# Patient Record
Sex: FEMALE | Race: BLACK OR AFRICAN AMERICAN | Employment: OTHER | URBAN - METROPOLITAN AREA
[De-identification: names, ages, dates, MRNs, and addresses within clinical notes are randomized per-mention and may not be internally consistent; named-entity substitution may affect disease eponyms.]

---

## 2021-05-25 ENCOUNTER — APPOINTMENT (OUTPATIENT)
Dept: NON INVASIVE DIAGNOSTICS | Age: 86
DRG: 682 | End: 2021-05-25
Attending: INTERNAL MEDICINE
Payer: MEDICARE

## 2021-05-25 ENCOUNTER — HOSPITAL ENCOUNTER (INPATIENT)
Age: 86
LOS: 4 days | Discharge: NURSING, REHAB OR LONG-TERM CARE | DRG: 682 | End: 2021-05-29
Attending: EMERGENCY MEDICINE | Admitting: INTERNAL MEDICINE
Payer: MEDICARE

## 2021-05-25 ENCOUNTER — APPOINTMENT (OUTPATIENT)
Dept: ULTRASOUND IMAGING | Age: 86
DRG: 682 | End: 2021-05-25
Attending: INTERNAL MEDICINE
Payer: MEDICARE

## 2021-05-25 DIAGNOSIS — Z95.0 PRESENCE OF PERMANENT CARDIAC PACEMAKER: ICD-10-CM

## 2021-05-25 DIAGNOSIS — I10 ESSENTIAL HYPERTENSION: ICD-10-CM

## 2021-05-25 DIAGNOSIS — R41.0 CONFUSION: ICD-10-CM

## 2021-05-25 DIAGNOSIS — I48.0 PAROXYSMAL A-FIB (HCC): ICD-10-CM

## 2021-05-25 DIAGNOSIS — E87.5 ACUTE HYPERKALEMIA: ICD-10-CM

## 2021-05-25 DIAGNOSIS — Z71.89 GOALS OF CARE, COUNSELING/DISCUSSION: ICD-10-CM

## 2021-05-25 DIAGNOSIS — R53.81 DEBILITY: ICD-10-CM

## 2021-05-25 DIAGNOSIS — N17.9 AKI (ACUTE KIDNEY INJURY) (HCC): Primary | ICD-10-CM

## 2021-05-25 PROBLEM — N18.9 RENAL FAILURE (ARF), ACUTE ON CHRONIC (HCC): Status: ACTIVE | Noted: 2021-05-25

## 2021-05-25 PROBLEM — E86.0 DEHYDRATION: Status: ACTIVE | Noted: 2021-05-25

## 2021-05-25 LAB
ALBUMIN SERPL-MCNC: 2.9 G/DL (ref 3.5–5)
ALBUMIN/GLOB SERPL: 0.6 {RATIO} (ref 1.1–2.2)
ALP SERPL-CCNC: 67 U/L (ref 45–117)
ALT SERPL-CCNC: 50 U/L (ref 12–78)
ANION GAP SERPL CALC-SCNC: 2 MMOL/L (ref 5–15)
AST SERPL-CCNC: 33 U/L (ref 15–37)
ATRIAL RATE: 267 BPM
BASOPHILS # BLD: 0 K/UL (ref 0–0.1)
BASOPHILS NFR BLD: 0 % (ref 0–1)
BILIRUB SERPL-MCNC: 0.4 MG/DL (ref 0.2–1)
BUN SERPL-MCNC: 77 MG/DL (ref 6–20)
BUN/CREAT SERPL: 22 (ref 12–20)
CALCIUM SERPL-MCNC: 9.2 MG/DL (ref 8.5–10.1)
CALCULATED R AXIS, ECG10: 4 DEGREES
CALCULATED T AXIS, ECG11: 113 DEGREES
CHLORIDE SERPL-SCNC: 97 MMOL/L (ref 97–108)
CO2 SERPL-SCNC: 37 MMOL/L (ref 21–32)
COMMENT, HOLDF: NORMAL
CREAT SERPL-MCNC: 3.5 MG/DL (ref 0.55–1.02)
DIAGNOSIS, 93000: NORMAL
DIFFERENTIAL METHOD BLD: ABNORMAL
ECHO AO ROOT DIAM: 3.11 CM
ECHO AV AREA PEAK VELOCITY: 1.9 CM2
ECHO AV AREA/BSA PEAK VELOCITY: 0.9 CM2/M2
ECHO AV PEAK GRADIENT: 9.64 MMHG
ECHO AV PEAK VELOCITY: 155.27 CM/S
ECHO EST RA PRESSURE: 8 MMHG
ECHO LA AREA 4C: 36.03 CM2
ECHO LA MAJOR AXIS: 4.03 CM
ECHO LA MINOR AXIS: 2 CM
ECHO LA VOL 2C: 91.14 ML (ref 22–52)
ECHO LA VOL 4C: 127.26 ML (ref 22–52)
ECHO LA VOL BP: 122.06 ML (ref 22–52)
ECHO LA VOL/BSA BIPLANE: 60.73 ML/M2 (ref 16–28)
ECHO LA VOLUME INDEX A2C: 45.34 ML/M2 (ref 16–28)
ECHO LA VOLUME INDEX A4C: 63.31 ML/M2 (ref 16–28)
ECHO LV E' LATERAL VELOCITY: 17.53 CENTIMETER/SECOND
ECHO LV E' SEPTAL VELOCITY: 8.28 CENTIMETER/SECOND
ECHO LV INTERNAL DIMENSION DIASTOLIC: 5.04 CM (ref 3.9–5.3)
ECHO LV INTERNAL DIMENSION SYSTOLIC: 3.39 CM
ECHO LV IVSD: 1.25 CM (ref 0.6–0.9)
ECHO LV MASS 2D: 217.7 G (ref 67–162)
ECHO LV MASS INDEX 2D: 108.3 G/M2 (ref 43–95)
ECHO LV POSTERIOR WALL DIASTOLIC: 1.01 CM (ref 0.6–0.9)
ECHO LVOT DIAM: 1.87 CM
ECHO LVOT PEAK GRADIENT: 4.66 MMHG
ECHO LVOT PEAK VELOCITY: 107.97 CM/S
ECHO MV E DECELERATION TIME (DT): 167.83 MS
ECHO MV E VELOCITY: 133.56 CENTIMETER/SECOND
ECHO PV PEAK INSTANTANEOUS GRADIENT SYSTOLIC: 2.25 MMHG
ECHO PV REGURGITANT MAX VELOCITY: 74.85 CM/S
ECHO RIGHT VENTRICULAR SYSTOLIC PRESSURE (RVSP): 63.88 MMHG
ECHO RV INTERNAL DIMENSION: 4.19 CM
ECHO RV TAPSE: 1.32 CM (ref 1.5–2)
ECHO TV REGURGITANT MAX VELOCITY: 373.77 CM/S
ECHO TV REGURGITANT PEAK GRADIENT: 55.88 MMHG
EOSINOPHIL # BLD: 0.1 K/UL (ref 0–0.4)
EOSINOPHIL NFR BLD: 2 % (ref 0–7)
ERYTHROCYTE [DISTWIDTH] IN BLOOD BY AUTOMATED COUNT: 17.6 % (ref 11.5–14.5)
FERRITIN SERPL-MCNC: 160 NG/ML (ref 26–388)
FOLATE SERPL-MCNC: 18.8 NG/ML (ref 5–21)
GLOBULIN SER CALC-MCNC: 4.8 G/DL (ref 2–4)
GLUCOSE SERPL-MCNC: 122 MG/DL (ref 65–100)
HAPTOGLOB SERPL-MCNC: 137 MG/DL (ref 30–200)
HCT VFR BLD AUTO: 29.7 % (ref 35–47)
HEMOCCULT STL QL: NEGATIVE
HGB BLD-MCNC: 9.1 G/DL (ref 11.5–16)
IMM GRANULOCYTES # BLD AUTO: 0.1 K/UL (ref 0–0.04)
IMM GRANULOCYTES NFR BLD AUTO: 1 % (ref 0–0.5)
IRON SATN MFR SERPL: 19 % (ref 20–50)
IRON SERPL-MCNC: 50 UG/DL (ref 35–150)
LA VOL DISK BP: 113.73 ML (ref 22–52)
LDH SERPL L TO P-CCNC: 257 U/L (ref 81–246)
LYMPHOCYTES # BLD: 0.6 K/UL (ref 0.8–3.5)
LYMPHOCYTES NFR BLD: 10 % (ref 12–49)
MCH RBC QN AUTO: 30.7 PG (ref 26–34)
MCHC RBC AUTO-ENTMCNC: 30.6 G/DL (ref 30–36.5)
MCV RBC AUTO: 100.3 FL (ref 80–99)
MONOCYTES # BLD: 0.6 K/UL (ref 0–1)
MONOCYTES NFR BLD: 9 % (ref 5–13)
NEUTS SEG # BLD: 5 K/UL (ref 1.8–8)
NEUTS SEG NFR BLD: 78 % (ref 32–75)
NRBC # BLD: 0 K/UL (ref 0–0.01)
NRBC BLD-RTO: 0 PER 100 WBC
OSMOLALITY SERPL: 331 MOSM/KG H2O
PLATELET # BLD AUTO: 168 K/UL (ref 150–400)
PMV BLD AUTO: 9.5 FL (ref 8.9–12.9)
POTASSIUM SERPL-SCNC: 5.4 MMOL/L (ref 3.5–5.1)
PROCALCITONIN SERPL-MCNC: <0.05 NG/ML
PROT SERPL-MCNC: 7.7 G/DL (ref 6.4–8.2)
Q-T INTERVAL, ECG07: 366 MS
QRS DURATION, ECG06: 98 MS
QTC CALCULATION (BEZET), ECG08: 457 MS
RBC # BLD AUTO: 2.96 M/UL (ref 3.8–5.2)
RBC MORPH BLD: ABNORMAL
RETICS # AUTO: 0.1 M/UL (ref 0.02–0.08)
RETICS/RBC NFR AUTO: 3.4 % (ref 0.7–2.1)
SAMPLES BEING HELD,HOLD: NORMAL
SODIUM SERPL-SCNC: 136 MMOL/L (ref 136–145)
TIBC SERPL-MCNC: 266 UG/DL (ref 250–450)
TSH SERPL DL<=0.05 MIU/L-ACNC: 0.98 UIU/ML (ref 0.36–3.74)
VENTRICULAR RATE, ECG03: 94 BPM
VIT B12 SERPL-MCNC: 1534 PG/ML (ref 193–986)
WBC # BLD AUTO: 6.4 K/UL (ref 3.6–11)

## 2021-05-25 PROCEDURE — 83615 LACTATE (LD) (LDH) ENZYME: CPT

## 2021-05-25 PROCEDURE — 84145 PROCALCITONIN (PCT): CPT

## 2021-05-25 PROCEDURE — 74011250636 HC RX REV CODE- 250/636: Performed by: INTERNAL MEDICINE

## 2021-05-25 PROCEDURE — 84443 ASSAY THYROID STIM HORMONE: CPT

## 2021-05-25 PROCEDURE — 77030019905 HC CATH URETH INTMIT MDII -A

## 2021-05-25 PROCEDURE — 93306 TTE W/DOPPLER COMPLETE: CPT

## 2021-05-25 PROCEDURE — 85025 COMPLETE CBC W/AUTO DIFF WBC: CPT

## 2021-05-25 PROCEDURE — 80053 COMPREHEN METABOLIC PANEL: CPT

## 2021-05-25 PROCEDURE — 82272 OCCULT BLD FECES 1-3 TESTS: CPT

## 2021-05-25 PROCEDURE — 82607 VITAMIN B-12: CPT

## 2021-05-25 PROCEDURE — 94640 AIRWAY INHALATION TREATMENT: CPT

## 2021-05-25 PROCEDURE — 83930 ASSAY OF BLOOD OSMOLALITY: CPT

## 2021-05-25 PROCEDURE — 77030038269 HC DRN EXT URIN PURWCK BARD -A

## 2021-05-25 PROCEDURE — 99285 EMERGENCY DEPT VISIT HI MDM: CPT

## 2021-05-25 PROCEDURE — 36415 COLL VENOUS BLD VENIPUNCTURE: CPT

## 2021-05-25 PROCEDURE — 85045 AUTOMATED RETICULOCYTE COUNT: CPT

## 2021-05-25 PROCEDURE — 93306 TTE W/DOPPLER COMPLETE: CPT | Performed by: INTERNAL MEDICINE

## 2021-05-25 PROCEDURE — 65270000029 HC RM PRIVATE

## 2021-05-25 PROCEDURE — 83540 ASSAY OF IRON: CPT

## 2021-05-25 PROCEDURE — 82746 ASSAY OF FOLIC ACID SERUM: CPT

## 2021-05-25 PROCEDURE — 82728 ASSAY OF FERRITIN: CPT

## 2021-05-25 PROCEDURE — 77030029684 HC NEB SM VOL KT MONA -A

## 2021-05-25 PROCEDURE — 74011250637 HC RX REV CODE- 250/637: Performed by: INTERNAL MEDICINE

## 2021-05-25 PROCEDURE — 93005 ELECTROCARDIOGRAM TRACING: CPT

## 2021-05-25 PROCEDURE — 74011000250 HC RX REV CODE- 250: Performed by: INTERNAL MEDICINE

## 2021-05-25 PROCEDURE — 83010 ASSAY OF HAPTOGLOBIN QUANT: CPT

## 2021-05-25 PROCEDURE — 76770 US EXAM ABDO BACK WALL COMP: CPT

## 2021-05-25 PROCEDURE — 99223 1ST HOSP IP/OBS HIGH 75: CPT | Performed by: INTERNAL MEDICINE

## 2021-05-25 RX ORDER — ATENOLOL 50 MG/1
50 TABLET ORAL DAILY
Status: DISCONTINUED | OUTPATIENT
Start: 2021-05-26 | End: 2021-05-25

## 2021-05-25 RX ORDER — ATORVASTATIN CALCIUM 20 MG/1
40 TABLET, FILM COATED ORAL
Status: DISCONTINUED | OUTPATIENT
Start: 2021-05-25 | End: 2021-05-29 | Stop reason: HOSPADM

## 2021-05-25 RX ORDER — BUDESONIDE AND FORMOTEROL FUMARATE DIHYDRATE 80; 4.5 UG/1; UG/1
2 AEROSOL RESPIRATORY (INHALATION) 2 TIMES DAILY
COMMUNITY

## 2021-05-25 RX ORDER — POLYETHYLENE GLYCOL 3350 17 G/17G
17 POWDER, FOR SOLUTION ORAL DAILY PRN
Status: DISCONTINUED | OUTPATIENT
Start: 2021-05-25 | End: 2021-05-29 | Stop reason: HOSPADM

## 2021-05-25 RX ORDER — BISMUTH SUBSALICYLATE 262 MG
1 TABLET,CHEWABLE ORAL DAILY
COMMUNITY

## 2021-05-25 RX ORDER — ATORVASTATIN CALCIUM 20 MG/1
20 TABLET, FILM COATED ORAL DAILY
Status: DISCONTINUED | OUTPATIENT
Start: 2021-05-26 | End: 2021-05-25

## 2021-05-25 RX ORDER — ARFORMOTEROL TARTRATE 15 UG/2ML
15 SOLUTION RESPIRATORY (INHALATION)
Status: DISCONTINUED | OUTPATIENT
Start: 2021-05-25 | End: 2021-05-29 | Stop reason: HOSPADM

## 2021-05-25 RX ORDER — ACETAMINOPHEN 500 MG
2000 TABLET ORAL DAILY
COMMUNITY

## 2021-05-25 RX ORDER — TORSEMIDE 20 MG/1
20 TABLET ORAL 2 TIMES DAILY
COMMUNITY

## 2021-05-25 RX ORDER — ACETAMINOPHEN 650 MG/1
650 SUPPOSITORY RECTAL
Status: DISCONTINUED | OUTPATIENT
Start: 2021-05-25 | End: 2021-05-29 | Stop reason: HOSPADM

## 2021-05-25 RX ORDER — DIPHENOXYLATE HYDROCHLORIDE AND ATROPINE SULFATE 2.5; .025 MG/1; MG/1
1 TABLET ORAL
COMMUNITY

## 2021-05-25 RX ORDER — LISINOPRIL 20 MG/1
20 TABLET ORAL DAILY
COMMUNITY
End: 2021-05-29

## 2021-05-25 RX ORDER — ONDANSETRON 2 MG/ML
4 INJECTION INTRAMUSCULAR; INTRAVENOUS
Status: DISCONTINUED | OUTPATIENT
Start: 2021-05-25 | End: 2021-05-29 | Stop reason: HOSPADM

## 2021-05-25 RX ORDER — LANOLIN ALCOHOL/MO/W.PET/CERES
325 CREAM (GRAM) TOPICAL
COMMUNITY

## 2021-05-25 RX ORDER — SERTRALINE HYDROCHLORIDE 25 MG/1
25 TABLET, FILM COATED ORAL DAILY
Status: DISCONTINUED | OUTPATIENT
Start: 2021-05-26 | End: 2021-05-29 | Stop reason: HOSPADM

## 2021-05-25 RX ORDER — SODIUM CHLORIDE, SODIUM LACTATE, POTASSIUM CHLORIDE, CALCIUM CHLORIDE 600; 310; 30; 20 MG/100ML; MG/100ML; MG/100ML; MG/100ML
50 INJECTION, SOLUTION INTRAVENOUS CONTINUOUS
Status: DISCONTINUED | OUTPATIENT
Start: 2021-05-25 | End: 2021-05-26

## 2021-05-25 RX ORDER — ONDANSETRON 4 MG/1
4 TABLET, ORALLY DISINTEGRATING ORAL
Status: DISCONTINUED | OUTPATIENT
Start: 2021-05-25 | End: 2021-05-29 | Stop reason: HOSPADM

## 2021-05-25 RX ORDER — POLYETHYLENE GLYCOL 3350 17 G/17G
17 POWDER, FOR SOLUTION ORAL
COMMUNITY

## 2021-05-25 RX ORDER — TRAZODONE HYDROCHLORIDE 50 MG/1
75 TABLET ORAL
COMMUNITY
End: 2021-05-29

## 2021-05-25 RX ORDER — SENNOSIDES 8.6 MG/1
1 TABLET ORAL DAILY
COMMUNITY

## 2021-05-25 RX ORDER — AMLODIPINE BESYLATE 5 MG/1
10 TABLET ORAL DAILY
Status: DISCONTINUED | OUTPATIENT
Start: 2021-05-26 | End: 2021-05-25

## 2021-05-25 RX ORDER — SODIUM CHLORIDE 0.9 % (FLUSH) 0.9 %
5-40 SYRINGE (ML) INJECTION EVERY 8 HOURS
Status: DISCONTINUED | OUTPATIENT
Start: 2021-05-25 | End: 2021-05-29 | Stop reason: HOSPADM

## 2021-05-25 RX ORDER — FAMOTIDINE 20 MG/1
20 TABLET, FILM COATED ORAL DAILY
Status: DISCONTINUED | OUTPATIENT
Start: 2021-05-26 | End: 2021-05-25

## 2021-05-25 RX ORDER — AMLODIPINE BESYLATE 5 MG/1
5 TABLET ORAL DAILY
Status: DISCONTINUED | OUTPATIENT
Start: 2021-05-26 | End: 2021-05-29 | Stop reason: HOSPADM

## 2021-05-25 RX ORDER — BUDESONIDE 0.5 MG/2ML
500 INHALANT ORAL
Status: DISCONTINUED | OUTPATIENT
Start: 2021-05-25 | End: 2021-05-29 | Stop reason: HOSPADM

## 2021-05-25 RX ORDER — IPRATROPIUM BROMIDE AND ALBUTEROL SULFATE 2.5; .5 MG/3ML; MG/3ML
3 SOLUTION RESPIRATORY (INHALATION)
Status: DISCONTINUED | OUTPATIENT
Start: 2021-05-25 | End: 2021-05-29 | Stop reason: HOSPADM

## 2021-05-25 RX ORDER — IPRATROPIUM BROMIDE AND ALBUTEROL SULFATE 2.5; .5 MG/3ML; MG/3ML
3 SOLUTION RESPIRATORY (INHALATION)
COMMUNITY

## 2021-05-25 RX ORDER — CLOPIDOGREL BISULFATE 75 MG/1
75 TABLET ORAL DAILY
Status: DISCONTINUED | OUTPATIENT
Start: 2021-05-26 | End: 2021-05-25

## 2021-05-25 RX ORDER — ACETAMINOPHEN, DIPHENHYDRAMINE HCL, PHENYLEPHRINE HCL 325; 25; 5 MG/1; MG/1; MG/1
10 TABLET ORAL
COMMUNITY

## 2021-05-25 RX ORDER — PANTOPRAZOLE SODIUM 40 MG/1
40 TABLET, DELAYED RELEASE ORAL
Status: DISCONTINUED | OUTPATIENT
Start: 2021-05-25 | End: 2021-05-29 | Stop reason: HOSPADM

## 2021-05-25 RX ORDER — ACETAMINOPHEN 325 MG/1
650 TABLET ORAL
Status: DISCONTINUED | OUTPATIENT
Start: 2021-05-25 | End: 2021-05-29 | Stop reason: HOSPADM

## 2021-05-25 RX ORDER — DEXTROMETHORPHAN HYDROBROMIDE, GUAIFENESIN 5; 100 MG/5ML; MG/5ML
650 LIQUID ORAL
COMMUNITY

## 2021-05-25 RX ORDER — ATORVASTATIN CALCIUM 40 MG/1
40 TABLET, FILM COATED ORAL
COMMUNITY

## 2021-05-25 RX ORDER — PAROXETINE HYDROCHLORIDE 20 MG/1
10 TABLET, FILM COATED ORAL DAILY
Status: DISCONTINUED | OUTPATIENT
Start: 2021-05-26 | End: 2021-05-25

## 2021-05-25 RX ORDER — AMLODIPINE BESYLATE 10 MG/1
10 TABLET ORAL DAILY
COMMUNITY

## 2021-05-25 RX ORDER — OMEPRAZOLE 10 MG/1
30 CAPSULE, DELAYED RELEASE ORAL
COMMUNITY

## 2021-05-25 RX ORDER — SODIUM CHLORIDE 0.9 % (FLUSH) 0.9 %
5-40 SYRINGE (ML) INJECTION AS NEEDED
Status: DISCONTINUED | OUTPATIENT
Start: 2021-05-25 | End: 2021-05-29 | Stop reason: HOSPADM

## 2021-05-25 RX ADMIN — SODIUM CHLORIDE 1000 ML: 9 INJECTION, SOLUTION INTRAVENOUS at 14:02

## 2021-05-25 RX ADMIN — ARFORMOTEROL TARTRATE 15 MCG: 15 SOLUTION RESPIRATORY (INHALATION) at 20:45

## 2021-05-25 RX ADMIN — SODIUM CHLORIDE 1000 ML: 9 INJECTION, SOLUTION INTRAVENOUS at 14:03

## 2021-05-25 RX ADMIN — PANTOPRAZOLE SODIUM 40 MG: 40 TABLET, DELAYED RELEASE ORAL at 21:22

## 2021-05-25 RX ADMIN — Medication 10 ML: at 21:22

## 2021-05-25 RX ADMIN — BUDESONIDE 500 MCG: 0.5 INHALANT RESPIRATORY (INHALATION) at 20:45

## 2021-05-25 RX ADMIN — ATORVASTATIN CALCIUM 40 MG: 20 TABLET, FILM COATED ORAL at 21:22

## 2021-05-25 RX ADMIN — SODIUM CHLORIDE, POTASSIUM CHLORIDE, SODIUM LACTATE AND CALCIUM CHLORIDE 50 ML/HR: 600; 310; 30; 20 INJECTION, SOLUTION INTRAVENOUS at 21:21

## 2021-05-25 RX ADMIN — APIXABAN 2.5 MG: 2.5 TABLET, FILM COATED ORAL at 21:22

## 2021-05-25 NOTE — ED NOTES
Spoke to Tammy from [employer], patient has been in [facility] since January, states no RVR only PVCs. reports sent and will be scanned into chart.

## 2021-05-25 NOTE — ED NOTES
TRANSFER - OUT REPORT:    Verbal report given to Amanda Lagos on Mulugeta Zoraida  being transferred to [facility] for routine progression of care       Report consisted of patients Situation, Background, Assessment and   Recommendations(SBAR). Information from the following report(s) SBAR, ED Summary, [facility] and Recent Results was reviewed with the receiving nurse. Opportunity for questions and clarification was provided.

## 2021-05-25 NOTE — CONSULTS
Nephrology Consult Note     Arash Arias [place] [address]     [email or web address]              Phone - [phone]   Patient: Sada Yoon   YOB: 1932    Date- 5/25/2021  MRN: [id number]             REASON FOR CONSULTATION: PIERRE  CONSULTING PHYSICIAN: Salas Patel MD     IMPRESSION:   #1 PIERRE  #2 hyperkalemia   #3 dementia  #4 hypertension  #5 cardiomyopathy with presence of cardiac pacemaker   #6 nursing home resident   PLAN:   Given elevated BUN ratio and history of dementia any use of loop diuretics, the first etiology to rule out would be volume depletion  We will check fractional excretion of urea  Trial of IV fluids with judicious rate due to history of cardiomyopathy and no EF available at this time  Please hold diuretics, ARB  Avoid any nephrotoxic medications and adjust all drugs to GFR less than 15  until creatinine is  Stable   monitor CBC, iron profile, renal profile  Pending renal ultrasound  Strict intake and output  Renal diet     · Active Problems:  ·   HTN (hypertension) (6/25/2013)  ·   ·   Presence of permanent cardiac pacemaker (6/26/2013)  ·   ·   Renal failure (ARF), acute on chronic ([facility] [address] (5/25/2021)  ·   ·   Hypertension ()  ·   ·   Hyperlipemia ()  ·   ·   Hx of completed stroke ()  ·   ·   GERD (gastroesophageal reflux disease) ()  ·   ·   Dementia (HCC) ()  ·   ·   Hyperkalemia (5/25/2021)  ·   ·   Dehydration (5/25/2021)  ·   ·     [x] High complexity decision making was performed  [x] Patient is at high-risk of decompensation with multiple organ involvement    Subjective:   HPI: Sada Yoon is a 80 y.o.  female brought to the Emergency Department by EMS with history of being more confused than baseline per [place] [facility] [facility] staff and with labs showing elevated Cr,BUN, potassium. BUN at 77, creatinine at 3.5, potassium at 5.4.   Of note medication list contained ARB and torsemide  When I evaluated the patient at the ED this afternoon, she had a tangential speech and kept repeating herself, unable to provide any meaningful HPI or review of systems. Review of Systems:   unable to do due to medical condition      Past Medical History:   Diagnosis Date    Anxiety and depression     Dementia ([facility] [address]     GERD (gastroesophageal reflux disease)     Hx of completed stroke     Hyperlipemia     Hypertension     Obese     Paroxysmal A-fib ([facility] [address]     Polypharmacy       Past Surgical History:   Procedure Laterality Date    HX HYSTERECTOMY      HX ORTHOPAEDIC  2011    right knee replacement    HX ORTHOPAEDIC  2009    left knee replacement    HX ORTHOPAEDIC  2011    right bunion    HX ORTHOPAEDIC      left rotator cuff    HX PACEMAKER  2006    pacemaker       Prior to Admission medications    Medication Sig Start Date End Date Taking? Authorizing Provider   amiodarone (CORDARONE) 200 mg tablet Take  by mouth. Provider, Historical   omeprazole (PRILOSEC) 20 mg capsule Take 20 mg by mouth daily. Provider, Historical   sertraline (ZOLOFT) 25 mg tablet Take  by mouth daily. Provider, Historical   traZODone (DESYREL) 50 mg tablet Take  by mouth nightly. Provider, Historical   torsemide (DEMADEX) 10 mg tablet Take  by mouth daily. Provider, Historical   apixaban (ELIQUIS) 2.5 mg tablet Take 2.5 mg by mouth two (2) times a day. Provider, Historical   oxyCODONE-acetaminophen (PERCOCET) 5-325 mg per tablet Take 1-2 Tabs by mouth every four (4) hours as needed for Pain. 8/24/13   REX Meade   atenolol (TENORMIN) 50 mg tablet Take  by mouth daily. Provider, Historical   amLODIPine (NORVASC) 5 mg tablet Take 5 mg by mouth daily. Provider, Historical   lansoprazole (PREVACID) 30 mg capsule Take  by mouth Daily (before breakfast). Provider, Historical   clopidogrel (PLAVIX) 75 mg tablet Take 75 mg by mouth daily. Provider, Historical   atorvastatin (LIPITOR) 20 mg tablet Take  by mouth daily. Provider, Historical   PARoxetine (PAXIL) 10 mg tablet Take  by mouth daily. Provider, Historical   valsartan (DIOVAN) 320 mg tablet Take 320 mg by mouth daily. Provider, Historical   solifenacin (VESICARE) 5 mg tablet Take 5 mg by mouth daily. Provider, Historical   zolpidem (AMBIEN) 5 mg tablet Take  by mouth nightly. Provider, Historical     No Known Allergies   Social History     Tobacco Use    Smoking status: Never Smoker    Smokeless tobacco: Never Used   Substance Use Topics    Alcohol use: No      Family History   Problem Relation Age of Onset    Cancer Mother         colon cancer    Heart Disease Father     Cancer Sister         breast    Cancer Brother         liver     Malignant Hyperthermia Neg Hx     Pseudocholinesterase Deficiency Neg Hx     Delayed Awakening Neg Hx     Post-op Nausea/Vomiting Neg Hx     Emergence Delirium Neg Hx     Post-op Cognitive Dysfunction Neg Hx         Objective:      Patient Vitals for the past 24 hrs:   Temp Pulse Resp BP SpO2   05/25/21 1126 98.2 °F (36.8 °C) 83 18 (!) 150/71 100 %     No intake/output data recorded. Physical Exam:  General: Awake but confused (unsure about baseline), in NAD  HEENT: AT/NC, dry mucous membranes  Neck:Supple,no JVD  Lungs: Laying flat without distress , normal respiratory effort  CV:RRR, S1 S2 normal  Abdomen:Soft,ND/NT  Extremities:no LE edema        CODE STATUS: DNR  Care Plan discussed with: ER nurse  Chart reviewed.     Yes Reviewed previous records             Lab Data Personally Reviewed: (see below)  Recent Labs     05/25/21  1131   WBC 6.4   HGB 9.1*      ANEU 5.0      K 5.4*   *   BUN 77*   CREA 3.50*   ALT 50   TBILI 0.4   AP 67   CA 9.2     Lab Results   Component Value Date/Time    Color YELLOW 06/20/2013 12:09 PM    Appearance CLEAR 06/20/2013 12:09 PM    Specific gravity 1.020 06/20/2013 12:09 PM    pH (UA) 5.5 06/20/2013 12:09 PM    Protein NEGATIVE  06/20/2013 12:09 PM    Glucose NEGATIVE  06/20/2013 12:09 PM    Ketone NEGATIVE  06/20/2013 12:09 PM    Bilirubin NEGATIVE  06/20/2013 12:09 PM    Urobilinogen 0.2 06/20/2013 12:09 PM    Nitrites NEGATIVE  06/20/2013 12:09 PM    Leukocyte Esterase NEGATIVE  06/20/2013 12:09 PM    Epithelial cells 0-5 06/20/2013 12:09 PM    Bacteria NEGATIVE  06/20/2013 12:09 PM    WBC 0-4 06/20/2013 12:09 PM    RBC 3-5 06/20/2013 12:09 PM       No results found for: IRON, FE, TIBC, IBCT, PSAT, FERR  Lab Results   Component Value Date/Time    Culture result: MIXED UROGENITAL ROHINI ISOLATED 06/20/2013 12:09 PM    Culture result:  06/20/2013 11:30 AM     MRSA NOT PRESENT      Screening of patient nares for MRSA is for surveillance purposes and, if positive, to facilitate isolation considerations in high risk settings. It is not intended for automatic decolonization interventions per se as regimens are not sufficiently effective to warrant routine use. Prior to Admission Medications   Prescriptions Last Dose Informant Patient Reported? Taking? PARoxetine (PAXIL) 10 mg tablet   Yes No   Sig: Take  by mouth daily. amLODIPine (NORVASC) 5 mg tablet   Yes No   Sig: Take 5 mg by mouth daily. amiodarone (CORDARONE) 200 mg tablet   Yes No   Sig: Take  by mouth. apixaban (ELIQUIS) 2.5 mg tablet   Yes No   Sig: Take 2.5 mg by mouth two (2) times a day. atenolol (TENORMIN) 50 mg tablet   Yes No   Sig: Take  by mouth daily. atorvastatin (LIPITOR) 20 mg tablet   Yes No   Sig: Take  by mouth daily. clopidogrel (PLAVIX) 75 mg tablet   Yes No   Sig: Take 75 mg by mouth daily. lansoprazole (PREVACID) 30 mg capsule   Yes No   Sig: Take  by mouth Daily (before breakfast). omeprazole (PRILOSEC) 20 mg capsule   Yes No   Sig: Take 20 mg by mouth daily. oxyCODONE-acetaminophen (PERCOCET) 5-325 mg per tablet   No No   Sig: Take 1-2 Tabs by mouth every four (4) hours as needed for Pain. sertraline (ZOLOFT) 25 mg tablet   Yes No   Sig: Take  by mouth daily. solifenacin (VESICARE) 5 mg tablet   Yes No   Sig: Take 5 mg by mouth daily. torsemide (DEMADEX) 10 mg tablet   Yes No   Sig: Take  by mouth daily. traZODone (DESYREL) 50 mg tablet   Yes No   Sig: Take  by mouth nightly. valsartan (DIOVAN) 320 mg tablet   Yes No   Sig: Take 320 mg by mouth daily. zolpidem (AMBIEN) 5 mg tablet   Yes No   Sig: Take  by mouth nightly. Facility-Administered Medications: None     Imaging:    Medications list Personally Reviewed   [x]      Yes     []               No    Thank you for allowing us to participate in the care this patient. We will follow patient with you. Signed By: Winford Phoenix, MD  [facility]      [email or web address]

## 2021-05-25 NOTE — PROGRESS NOTES
[facility]: MED RECONCILIATION    Comments/Recommendations:   -Medication list obtained from [facility]    Medications added:     Vitamin D  Ferrous sulfate  Duoneb  Lisinopril  Lomotil  Melatonin  Miralax  MVI  Senokot  Symbicort  Acetaminophen    Medications removed: Atenolol 50mg - sig not provided  Plavix 75mg PO daily  Lansoprazole 30mg PO daily  Percocet 5-325 1-2 tabs PO Q4hrs prn  Paroxetine 10mg - sig not provided  Vesicare 5mg PO daily  Valsartan 320mg PO daily  Zolpidem 5mg PO QHS    Medications adjusted:    Amlodipine 10mg PO QAM; Increase from 5mg PO daily as previously reported. Omeprazole 30mg PO daily; Increase from 20mg PO daily as previously reported. Information obtained from: Transfer paperwork ([facility])     Allergies: Patient has no known allergies. Prior to Admission Medications:   Prior to Admission Medications   Prescriptions Last Dose Informant Taking?   acetaminophen (TYLENOL) 650 mg TbER  Transfer Papers Yes   Sig: Take 650 mg by mouth every eight (8) hours as needed for Pain. albuterol-ipratropium (DUO-NEB) 2.5 mg-0.5 mg/3 ml nebu  Transfer Papers Yes   Sig: 3 mL by Nebulization route every six (6) hours as needed for Wheezing. amLODIPine (NORVASC) 10 mg tablet 5/25/2021 at am Transfer Papers Yes   Sig: Take 10 mg by mouth daily. amiodarone (CORDARONE) 200 mg tablet 5/25/2021 at am Transfer Papers Yes   Sig: Take 200 mg by mouth daily. apixaban (ELIQUIS) 2.5 mg tablet 5/25/2021 at am Transfer Papers Yes   Sig: Take 2.5 mg by mouth two (2) times a day. atorvastatin (LIPITOR) 40 mg tablet 5/24/2021 at pm Transfer Papers Yes   Sig: Take 40 mg by mouth nightly. budesonide-formoteroL (Symbicort) 80-4.5 mcg/actuation HFAA 5/25/2021 at am Transfer Papers Yes   Sig: Take 2 Puffs by inhalation two (2) times a day. cholecalciferol (VITAMIN D3) (2,000 UNITS /50 MCG) cap capsule 5/25/2021 at am Transfer Papers Yes   Sig: Take 2,000 Units by mouth daily.    diphenoxylate-atropine (LomotiL) 2.5-0.025 mg per tablet  Transfer Papers Yes   Sig: Take 1 Tablet by mouth every six (6) hours as needed for Diarrhea. ferrous sulfate 325 mg (65 mg iron) tablet 5/25/2021 at am Transfer Papers Yes   Sig: Take 325 mg by mouth Daily (before breakfast). lisinopriL (PRINIVIL, ZESTRIL) 20 mg tablet 5/25/2021 at am Transfer Papers Yes   Sig: Take 20 mg by mouth daily. melatonin 10 mg tab 5/24/2021 at pm Transfer Papers Yes   Sig: Take 10 mg by mouth nightly. multivitamin (ONE A DAY) tablet 5/25/2021 at am Transfer Papers Yes   Sig: Take 1 Tablet by mouth daily. omeprazole (PRILOSEC) 10 mg capsule 5/24/2021 at pm Transfer Papers Yes   Sig: Take 30 mg by mouth nightly. polyethylene glycol (Miralax) 17 gram packet  Transfer Papers Yes   Sig: Take 17 g by mouth daily as needed for Constipation. senna (Senokot) 8.6 mg tablet 5/25/2021 at am Transfer Papers Yes   Sig: Take 1 Tablet by mouth daily. sertraline (ZOLOFT) 25 mg tablet 5/25/2021 at am Transfer Papers Yes   Sig: Take 25 mg by mouth daily. torsemide (DEMADEX) 20 mg tablet 5/25/2021 at am Transfer Papers Yes   Sig: Take 20 mg by mouth two (2) times a day. traZODone (DESYREL) 50 mg tablet 5/24/2021 at pm Transfer Papers Yes   Sig: Take 75 mg by mouth nightly. Facility-Administered Medications: None             Jasen Sommers. DONNA   [address]

## 2021-05-25 NOTE — ED NOTES
Spoke to Lucie at [facility], was able to get a better picture on patient mentation and weakness. Per Lucie at baseline patient is A&OX3 with intermittent confusions, is slow to respond at baseline. patient finished 10 days on macrobid for UTI yesterday.  Patient not ambulatory due to back fracture and knee surgery without PT, due to patients refusal

## 2021-05-25 NOTE — PROGRESS NOTES
Bedside and Verbal shift change report given to Elliott Clark (oncoming nurse) by Paola Capps (offgoing nurse). Report included the following information SBAR, ED Summary, Intake/Output, MAR and Recent Results.

## 2021-05-25 NOTE — ED PROVIDER NOTES
The history is provided by the patient and the EMS personnel. Abnormal Lab Results  This is a new problem. Pertinent negatives include no chest pain, no abdominal pain, no headaches and no shortness of breath.         Past Medical History:   Diagnosis Date    GERD (gastroesophageal reflux disease)     controlled    Hypertension     Morbid obesity ([address]     Other ill-defined conditions(799.89)     hyperlipidemia    Stroke ([facility] [address] 2007    stroke - no residual       Past Surgical History:   Procedure Laterality Date    HX HYSTERECTOMY      HX ORTHOPAEDIC  2011    right knee replacement    HX ORTHOPAEDIC  2009    left knee replacement    HX ORTHOPAEDIC  2011    right bunion    HX ORTHOPAEDIC      left rotator cuff    HX PACEMAKER  2006    pacemaker          Family History:   Problem Relation Age of Onset    Cancer Mother         colon cancer    Heart Disease Father     Cancer Sister         breast    Cancer Brother         liver     Malignant Hyperthermia Neg Hx     Pseudocholinesterase Deficiency Neg Hx     Delayed Awakening Neg Hx     Post-op Nausea/Vomiting Neg Hx     Emergence Delirium Neg Hx     Post-op Cognitive Dysfunction Neg Hx        Social History     Socioeconomic History    Marital status:      Spouse name: Not on file    Number of children: Not on file    Years of education: Not on file    Highest education level: Not on file   Occupational History    Not on file   Tobacco Use    Smoking status: Never Smoker    Smokeless tobacco: Never Used   Substance and Sexual Activity    Alcohol use: No    Drug use: No    Sexual activity: Not on file   Other Topics Concern    Not on file   Social History Narrative    Not on file     Social Determinants of Health     Financial Resource Strain:     Difficulty of Paying Living Expenses:    Food Insecurity:     Worried About Running Out of Food in the Last Year:     Negin of Food in the Last Year:    Transportation Needs:     Lack of Transportation (Medical):  Lack of Transportation (Non-Medical):    Physical Activity:     Days of Exercise per Week:     Minutes of Exercise per Session:    Stress:     Feeling of Stress :    Social Connections:     Frequency of Communication with Friends and Family:     Frequency of Social Gatherings with Friends and Family:     Attends Yarsanism Services:     Active Member of Clubs or Organizations:     Attends Club or Organization Meetings:     Marital Status:    Intimate Partner Violence:     Fear of Current or Ex-Partner:     Emotionally Abused:     Physically Abused:     Sexually Abused: ALLERGIES: Patient has no known allergies. Review of Systems   Constitutional: Negative for activity change, chills and fever. HENT: Negative for nosebleeds, sore throat, trouble swallowing and voice change. Eyes: Negative for visual disturbance. Respiratory: Negative for shortness of breath. Cardiovascular: Negative for chest pain and palpitations. Gastrointestinal: Negative for abdominal pain, constipation, diarrhea and nausea. Genitourinary: Negative for difficulty urinating, dysuria, hematuria and urgency. Musculoskeletal: Negative for back pain, neck pain and neck stiffness. Skin: Negative for color change. Allergic/Immunologic: Negative for immunocompromised state. Neurological: Negative for dizziness, seizures, syncope, weakness, light-headedness, numbness and headaches. Psychiatric/Behavioral: Negative for behavioral problems, confusion, hallucinations, self-injury and suicidal ideas. Vitals:    05/25/21 1126   BP: (!) 150/71   Pulse: 83   Resp: 18   Temp: 98.2 °F (36.8 °C)   SpO2: 100%   Weight: 96.6 kg (213 lb)   Height: 5' 4\" (1.626 m)            Physical Exam  Vitals and nursing note reviewed. Constitutional:       General: She is not in acute distress. Appearance: She is well-developed. She is not diaphoretic.    HENT:      Head: Normocephalic and atraumatic. Eyes:      Pupils: Pupils are equal, round, and reactive to light. Cardiovascular:      Rate and Rhythm: Normal rate and regular rhythm. Heart sounds: Normal heart sounds. No murmur heard. No friction rub. No gallop. Pulmonary:      Effort: Pulmonary effort is normal. No respiratory distress. Breath sounds: Normal breath sounds. No wheezing. Abdominal:      General: Bowel sounds are normal. There is no distension. Palpations: Abdomen is soft. Tenderness: There is no abdominal tenderness. There is no guarding or rebound. Musculoskeletal:         General: Normal range of motion. Cervical back: Normal range of motion and neck supple. Right lower leg: 3+ Edema present. Left lower leg: 3+ Edema present. Skin:     General: Skin is warm. Findings: No rash. Neurological:      Mental Status: She is alert. MDM     This is an 19-year-old female with past medical history, review of systems, physical exam as above, presenting with reported abnormal lab values. Patient presents from EMS from long-term care facility. Chart review indicates history of CVA, chronically bedbound, with a history of chronic kidney disease in addition to hypertension. Per report, patient with blood work performed yesterday at an outside hospital system indicating hyperkalemia of 6.1. Patient awake, alert, with delayed responses, who appears in no acute distress on oxygen via nasal cannula which was reported to be her baseline. She denies complaints at this time. She has clear breath sounds auscultation, soft nontender abdomen, regular rate and rhythm without murmurs gallops or rubs, bilateral lower extremity pedal edema. Differential includes worsening kidney disease, with hyperkalemia versus lab error. Will obtain EKG, CMP, CBC. We will reassess, and make a disposition.     Procedures    Perfect Serve Consult for Admission  12:27 PM    ED Room Number: BO92/37  Patient Name and age: Radha Quesada 80 y.o.  female  Working Diagnosis:   1. PIERRE (acute kidney injury) ([facility] [address]    2.  Acute hyperkalemia        COVID-19 Suspicion:  no  Sepsis present:  no  Reassessment needed: no  Code Status:  Do Not Resuscitate  Readmission: no  Isolation Requirements:  no  Recommended Level of Care:  telemetry  Department:[facility] ED - [phone]  Other:  D/w Dr. Madelyn Bailey

## 2021-05-25 NOTE — CONSULTS
Thomas Barksdale MD [facility] [address]  Office [phone]      Date of  Admission: 5/25/2021 11:10 AM       Assessment/Plan:   · ARF: unclear of baseline Cr. Hold nephrotoxic agents. Renal US today. Cr 3.5   · Hx PAF s/p PPM in 2006: interrogate device today. Holding amio . Was on eliquis (lower dose) PTA  · ? CHF hx: ck [facility] records. ECHO. Hold arb and torsemide;   · HTN: hold arb given PIERRE, resume BB   · HLD  · Dementia:   · GERD:       Pt personally seen and examined. Chart reviewed. Agree with advanced NP's history, exam and  A/P with changes/additons. Pt sent to ED from [facility]    Blood pressure (!) 150/71, pulse 83, temperature 98.2 °F (36.8 °C), resp. rate 18, height 5' 4\" (1.626 m), weight 213 lb (96.6 kg), SpO2 100 %. CVS-S1-S2 present, Irr   2/6 systolic murmur present  LE-   no edema    Trop neg    A/P -PIERRE on CKD -hold ARB/diuretic; Nephrology consult          Afib ; PPM - obtain records from [facility]; ECHO pending      Cinda Whiteside MD, [facility]          Thank you for allowing us to participate in Kimberly Bhagat care. We will be happy to follow along. Please call for any questions. Hadley Quinn MD, [facility]        Consult requested by Av Seymour MD for *Renal failure (ARF), acute on chronic ([facility] [N17.9, N18.9]    Subjective: Kimberly Bhagat is a 80 y.o. AA female  with PMH of PAF s/p PPM followed at [facility], ? CHF, HTN, HLD, GERD, CVA, CKD, dementia, obesity who was transferred from [facility] to ED with hyperkalemia. Pt is a poor historian. Pt says she was last seen 6 months ago at [facility] by cardiology and \"they checked my heart\". Baseline activity: wheelchair. The patient denies chest pain, dependent edema, diaphoresis, ADORNO, orthopnea, palpitations, PND, shortness of breath, claudication or syncope. No bleeding.      Cardiac risk factors    HTN   HLD  Post menopausal female        Cardiographics: Telemetry:  A paced  ECG: pending    Cardiac Testing/ Procedures: A. Cardiac Cath/PCI:   B.ECHO/ENRIQUE:   C.StressNuclear/Stress ECHO/Stress test:   7/3/13 - Stress nuclear - Normal; EF 65%  D. Vascular:   E. EP: medtronic PPM   F. Miscellaneous    SOCIAL HX: lives at [facility], one dtr in [facility], 1 son in [facility].  Non smoker        Patient Active Problem List    Diagnosis Date Noted    Renal failure (ARF), acute on chronic ([address] 05/25/2021    Hyperkalemia 05/25/2021    Dehydration 05/25/2021    Hypertension     Hyperlipemia     Hx of completed stroke     GERD (gastroesophageal reflux disease)     Dementia ([address]     Obese     Anxiety and depression     Polypharmacy     Paroxysmal A-fib ([address]     Presence of permanent cardiac pacemaker 06/26/2013    HTN (hypertension) 06/25/2013      Past Medical History:   Diagnosis Date    Anxiety and depression     Dementia ([address]     GERD (gastroesophageal reflux disease)     Hx of completed stroke     Hyperlipemia     Hypertension     Obese     Paroxysmal A-fib ([address]     Polypharmacy       Past Surgical History:   Procedure Laterality Date    HX HYSTERECTOMY      HX ORTHOPAEDIC  2011    right knee replacement    HX ORTHOPAEDIC  2009    left knee replacement    HX ORTHOPAEDIC  2011    right bunion    HX ORTHOPAEDIC      left rotator cuff    HX PACEMAKER  2006    pacemaker      No Known Allergies   Current Facility-Administered Medications   Medication Dose Route Frequency    sodium chloride 0.9 % bolus infusion 1,000 mL  1,000 mL IntraVENous ONCE    sodium chloride 0.9 % bolus infusion 1,000 mL  1,000 mL IntraVENous ONCE    sodium chloride (NS) flush 5-40 mL  5-40 mL IntraVENous Q8H    sodium chloride (NS) flush 5-40 mL  5-40 mL IntraVENous PRN    acetaminophen (TYLENOL) tablet 650 mg  650 mg Oral Q6H PRN    Or    acetaminophen (TYLENOL) suppository 650 mg  650 mg Rectal Q6H PRN    polyethylene glycol (MIRALAX) packet 17 g  17 g Oral DAILY PRN    ondansetron (ZOFRAN ODT) tablet 4 mg  4 mg Oral Q8H PRN    Or    ondansetron (ZOFRAN) injection 4 mg  4 mg IntraVENous Q6H PRN    [START ON 5/26/2021] famotidine (PEPCID) tablet 20 mg  20 mg Oral DAILY     Current Outpatient Medications   Medication Sig    amiodarone (CORDARONE) 200 mg tablet Take  by mouth.  omeprazole (PRILOSEC) 20 mg capsule Take 20 mg by mouth daily.  sertraline (ZOLOFT) 25 mg tablet Take  by mouth daily.  traZODone (DESYREL) 50 mg tablet Take  by mouth nightly.  torsemide (DEMADEX) 10 mg tablet Take  by mouth daily.  apixaban (ELIQUIS) 2.5 mg tablet Take 2.5 mg by mouth two (2) times a day.  oxyCODONE-acetaminophen (PERCOCET) 5-325 mg per tablet Take 1-2 Tabs by mouth every four (4) hours as needed for Pain.  atenolol (TENORMIN) 50 mg tablet Take  by mouth daily.  amLODIPine (NORVASC) 5 mg tablet Take 5 mg by mouth daily.  lansoprazole (PREVACID) 30 mg capsule Take  by mouth Daily (before breakfast).  clopidogrel (PLAVIX) 75 mg tablet Take 75 mg by mouth daily.  atorvastatin (LIPITOR) 20 mg tablet Take  by mouth daily.  PARoxetine (PAXIL) 10 mg tablet Take  by mouth daily.  valsartan (DIOVAN) 320 mg tablet Take 320 mg by mouth daily.  solifenacin (VESICARE) 5 mg tablet Take 5 mg by mouth daily.  zolpidem (AMBIEN) 5 mg tablet Take  by mouth nightly. Review of Symptoms:  Constitutional: negative for fatigue  ENT: negative   Respiratory: negative for dyspnea on exertion  Gastrointestinal: negative for nausea and vomiting  Genitourinary: incontinence  Musculoskeletal:positive for muscle weakness  Neurological: positive for memory problems  Other systems reviewed and negative except as above.   Social History     Socioeconomic History    Marital status:      Spouse name: Not on file    Number of children: Not on file    Years of education: Not on file    Highest education level: Not on file   Tobacco Use    Smoking status: Never Smoker    Smokeless tobacco: Never Used   Substance and Sexual Activity    Alcohol use: No    Drug use: No     Social Determinants of Health     Financial Resource Strain:     Difficulty of Paying Living Expenses:    Food Insecurity:     Worried About Running Out of Food in the Last Year:     [address] in the Last Year:    Transportation Needs:     Lack of Transportation (Medical):  Lack of Transportation (Non-Medical):    Physical Activity:     Days of Exercise per Week:     Minutes of Exercise per Session:    Stress:     Feeling of Stress :    Social Connections:     Frequency of Communication with Friends and Family:     Frequency of Social Gatherings with Friends and Family:     Attends [employer] Services:     Active Member of Clubs or Organizations:     Attends Club or Organization Meetings:     Marital Status:      Family History   Problem Relation Age of Onset    Cancer Mother         colon cancer    Heart Disease Father     Cancer Sister         breast    Cancer Brother         liver     Malignant Hyperthermia Neg Hx     Pseudocholinesterase Deficiency Neg Hx     Delayed Awakening Neg Hx     Post-op Nausea/Vomiting Neg Hx     Emergence Delirium Neg Hx     Post-op Cognitive Dysfunction Neg Hx          Physical Exam    Visit Vitals  BP (!) 150/71 (BP 1 Location: Left arm, BP Patient Position: At rest)   Pulse 83   Temp 98.2 °F (36.8 °C)   Resp 18   Ht 5' 4\" (1.626 m)   Wt 96.6 kg (213 lb)   SpO2 100%   BMI 36.56 kg/m²     General: awake, alert, and oriented. MAEx4. No acute distress   HEENT Exam:     Normocephalic, atraumatic. Sclera anicteric . Neck: supple, no lymphadenopathy  Lung Exam:     Not  dyspneic. Respirations unlabored. O2 @ 100 % 2 liters  Sat:  . Lungs: No wheezes, crackles, rhonchi, or rubs heard on auscultation.       Heart Exam:       PMI nondisplaced,  Rate: Rhythm: regular, 2/6 systolic murmur     No JVD, no carotid bruits, No HJR      Abdomen Exam:      obese, soft, nontender. + Bowel sounds. No palpable masses; organomegaly. : voiding     Extremities Exam:      Atraumatic. + 1 LE edema. R foot deformity    Vascular Exam:      radial, brachial, dorsalis pedis, posterior tibial, are equal and strong bilaterally     Neuro exam:  No focal deficits   Psy Exam: Normal affect, does not appear anxious or agitated        Recent Results (from the past 12 hour(s))   CBC WITH AUTOMATED DIFF    Collection Time: 05/25/21 11:31 AM   Result Value Ref Range    WBC 6.4 3.6 - 11.0 K/uL    RBC 2.96 (L) 3.80 - 5.20 M/uL    HGB 9.1 (L) 11.5 - 16.0 g/dL    HCT 29.7 (L) 35.0 - 47.0 %    .3 (H) 80.0 - 99.0 FL    MCH 30.7 26.0 - 34.0 PG    MCHC 30.6 30.0 - 36.5 g/dL    RDW 17.6 (H) 11.5 - 14.5 %    PLATELET 029 392 - 272 K/uL    MPV 9.5 8.9 - 12.9 FL    NRBC 0.0 0  WBC    ABSOLUTE NRBC 0.00 0.00 - 0.01 K/uL    NEUTROPHILS 78 (H) 32 - 75 %    LYMPHOCYTES 10 (L) 12 - 49 %    MONOCYTES 9 5 - 13 %    EOSINOPHILS 2 0 - 7 %    BASOPHILS 0 0 - 1 %    IMMATURE GRANULOCYTES 1 (H) 0.0 - 0.5 %    ABS. NEUTROPHILS 5.0 1.8 - 8.0 K/UL    ABS. LYMPHOCYTES 0.6 (L) 0.8 - 3.5 K/UL    ABS. MONOCYTES 0.6 0.0 - 1.0 K/UL    ABS. EOSINOPHILS 0.1 0.0 - 0.4 K/UL    ABS. BASOPHILS 0.0 0.0 - 0.1 K/UL    ABS. IMM.  GRANS. 0.1 (H) 0.00 - 0.04 K/UL    DF SMEAR SCANNED      RBC COMMENTS NORMOCYTIC, NORMOCHROMIC     METABOLIC PANEL, COMPREHENSIVE    Collection Time: 05/25/21 11:31 AM   Result Value Ref Range    Sodium 136 136 - 145 mmol/L    Potassium 5.4 (H) 3.5 - 5.1 mmol/L    Chloride 97 97 - 108 mmol/L    CO2 37 (H) 21 - 32 mmol/L    Anion gap 2 (L) 5 - 15 mmol/L    Glucose 122 (H) 65 - 100 mg/dL    BUN 77 (H) 6 - 20 MG/DL    Creatinine 3.50 (H) 0.55 - 1.02 MG/DL    BUN/Creatinine ratio 22 (H) 12 - 20      GFR est AA 15 (L) >60 ml/min/1.73m2    GFR est non-AA 12 (L) >60 ml/min/1.73m2    Calcium 9.2 8.5 - 10.1 MG/DL    Bilirubin, total 0.4 0.2 - 1.0 MG/DL    ALT (SGPT) 50 12 - 78 U/L    AST (SGOT) 33 15 - 37 U/L    Alk. phosphatase 67 45 - 117 U/L    Protein, total 7.7 6.4 - 8.2 g/dL    Albumin 2.9 (L) 3.5 - 5.0 g/dL    Globulin 4.8 (H) 2.0 - 4.0 g/dL    A-G Ratio 0.6 (L) 1.1 - 2.2     SAMPLES BEING HELD    Collection Time: 05/25/21 11:31 AM   Result Value Ref Range    SAMPLES BEING HELD 1SST,1RD,1DRK GRN     COMMENT        Add-on orders for these samples will be processed based on acceptable specimen integrity and analyte stability, which may vary by analyte.        Pierre Ingram MS [facility]

## 2021-05-25 NOTE — H&P
[facility]    Hospitalist Admission Note      NAME:  Mac Ordoñez   :   1932   MRN:  [id number]     PCP:  Trinda Lesch, MD     Date/Time of service:  2021 12:46 PM          Subjective:     CHIEF COMPLAINT: elevated K     HISTORY OF PRESENT ILLNESS:     Ms. Loan Clark is a 80 y.o.  female who presented to the Emergency Department complaining of elevated K. She provides no reliable hx and family is not present. Unclear recent baseline, but labs show elevated Cr and BUN, suggested ARF. Patient is more confused than baseline, per [facility] staff. Patient is immobile, due to refusing PT, after back and leg injuries. We will admit her for management.       Past Medical History:   Diagnosis Date    Anxiety and depression     Dementia ([facility]     GERD (gastroesophageal reflux disease)     Hx of completed stroke     Hyperlipemia     Hypertension     Obese     Paroxysmal A-fib ([facility] [address] [facility]     Polypharmacy         Past Surgical History:   Procedure Laterality Date    HX HYSTERECTOMY      HX ORTHOPAEDIC      right knee replacement    HX ORTHOPAEDIC  2009    left knee replacement    HX ORTHOPAEDIC  2011    right bunion    HX ORTHOPAEDIC      left rotator cuff    HX PACEMAKER  2006    pacemaker        Social History     Tobacco Use    Smoking status: Never Smoker    Smokeless tobacco: Never Used   Substance Use Topics    Alcohol use: No        Family History   Problem Relation Age of Onset    Cancer Mother         colon cancer    Heart Disease Father     Cancer Sister         breast    Cancer Brother         liver     Malignant Hyperthermia Neg Hx     Pseudocholinesterase Deficiency Neg Hx     Delayed Awakening Neg Hx     Post-op Nausea/Vomiting Neg Hx     Emergence Delirium Neg Hx     Post-op Cognitive Dysfunction Neg Hx       Family hx cannot be fully assessed, since the patient cannot provide information    No Known Allergies     Prior to Admission medications    Medication Sig Start Date End Date Taking? Authorizing Provider   amiodarone (CORDARONE) 200 mg tablet Take  by mouth. Provider, Historical   omeprazole (PRILOSEC) 20 mg capsule Take 20 mg by mouth daily. Provider, Historical   sertraline (ZOLOFT) 25 mg tablet Take  by mouth daily. Provider, Historical   traZODone (DESYREL) 50 mg tablet Take  by mouth nightly. Provider, Historical   torsemide (DEMADEX) 10 mg tablet Take  by mouth daily. Provider, Historical   apixaban (ELIQUIS) 2.5 mg tablet Take 2.5 mg by mouth two (2) times a day. Provider, Historical   oxyCODONE-acetaminophen (PERCOCET) 5-325 mg per tablet Take 1-2 Tabs by mouth every four (4) hours as needed for Pain. 8/24/13   ZO[employer]nis Counter, PA   atenolol (TENORMIN) 50 mg tablet Take  by mouth daily. Provider, Historical   amLODIPine (NORVASC) 5 mg tablet Take 5 mg by mouth daily. Provider, Historical   lansoprazole (PREVACID) 30 mg capsule Take  by mouth Daily (before breakfast). Provider, Historical   clopidogrel (PLAVIX) 75 mg tablet Take 75 mg by mouth daily. Provider, Historical   atorvastatin (LIPITOR) 20 mg tablet Take  by mouth daily. Provider, Historical   PARoxetine (PAXIL) 10 mg tablet Take  by mouth daily. Provider, Historical   valsartan (DIOVAN) 320 mg tablet Take 320 mg by mouth daily. Provider, Historical   solifenacin (VESICARE) 5 mg tablet Take 5 mg by mouth daily. Provider, Historical   zolpidem (AMBIEN) 5 mg tablet Take  by mouth nightly.     Provider, Historical       Review of Systems:  (bold if positive, if negative)    Gen:   fatigueEyes:  ENT:  CVS:  Pulm:  GI:  :  MS:  arthritisSkin:  Psych:  Endo:  Hem:  Renal:  Neuro:  weakness      Objective:      VITALS:    Vital signs reviewed; most recent are:    Visit Vitals  BP (!) 150/71 (BP 1 Location: Left arm, BP Patient Position: At rest)   Pulse 83   Temp 98.2 °F (36.8 °C)   Resp 18   Ht 5' 4\" (1.626 m)   Wt 96.6 kg (213 lb)   SpO2 100%   BMI 36.56 kg/m²     SpO2 Readings from Last 6 Encounters:   05/25/21 100%   10/10/16 96%   08/24/16 98%   08/25/13 99%   06/20/13 99%    O2 Flow Rate (L/min): 2 l/min   No intake or output data in the 24 hours ending 05/25/21 1246     Exam:     Physical Exam:    Gen:  Obese, in no acute distress  HEENT:  Pink conjunctivae, PERRL, hearing intact to voice, moist mucous membranes  Neck:  Supple, without masses, thyroid non-tender  Resp:  No accessory muscle use, clear breath sounds without wheezes rales or rhonchi  Card:  No murmurs, normal S1, S2 without thrills, bruits or peripheral edema  Abd:  Soft, non-tender, non-distended, normoactive bowel sounds are present, no mass  Lymph:  No cervical or inguinal adenopathy  Musc:  No cyanosis or clubbing  Skin:  No rashes or ulcers, skin turgor is good  Neuro:  Cranial nerves are grossly intact, general severe motor weakness, follows commands vaguely  Psych:  Poor insight, oriented to person, place     Labs:    Recent Labs     05/25/21  1131   WBC 6.4   HGB 9.1*   HCT 29.7*        Recent Labs     05/25/21  1131      K 5.4*   CL 97   CO2 37*   *   BUN 77*   CREA 3.50*   CA 9.2   ALB 2.9*   TBILI 0.4   ALT 50     No results found for: GLUCPOC  No results for input(s): PH, PCO2, PO2, HCO3, FIO2 in the last 72 hours. No results for input(s): INR, INREXT in the last 72 hours. All Micro Results     Procedure Component Value Units Date/Time    CULTURE, URINE [[id number]]     Order Status: Sent Specimen: Urine from Clean catch           I have reviewed previous records       Assessment and Plan:      Renal failure (ARF), acute on chronic / Hyperkalemia / Dehydration - POA, likely due to a combination of her meds (torsemide, lisinopril) and poor PO intake. Hold those meds. Hydrate. Follow labs. Consult renal.  Check urine serologies and US kidneys.     Acute metabolic encephalopathy / Dementia / Anxiety and depression / Insomnia / Polypharmacy - Continue her typical sertraline, but hold trazodone. Monitor. Atrial fibrillation / Chronic anticoagulation / HTN (hypertension) / Presence of permanent cardiac pacemaker - POA, rate stable, continue norvasc and eliquis. Hold amiodarone due to ARF. No known hx of CHF, so hold ACE and torsemide. Check ECHO. Consulted cardiology to clarify her issues    Hx of completed stroke / Hyperlipemia - continue plavix, eliquis and atorvastatin. Check lipid panel. GERD (gastroesophageal reflux disease) - PPI    COPD - Continue brovana and pulmicort in place of symbicort    Chronic pain / Arthritis - Tylenol prn. Hold percocet due to confusion. Telemetry reviewed:   AFIB    Risk of deterioration: high      Total time spent with patient: 48 Minutes I personally reviewed chart, notes, data and current medications in the medical record. I have personally examined and treated the patient at bedside during this period.                  Care Plan discussed with: Patient, Nursing Staff and >50% of time spent in counseling and coordination of care    Discussed:  Care Plan       ___________________________________________________    Attending Physician: Giles Pinzon MD

## 2021-05-25 NOTE — ED TRIAGE NOTES
Patient arrives via EMS form [employer] Was sent by PCP for K+ 6.1. per ems patient is alert and oriented x 4 en route at time of triage patient is only oriented to self. Patient unable to give birthday. Patient following commands at this time.

## 2021-05-26 LAB
ALBUMIN SERPL-MCNC: 2.7 G/DL (ref 3.5–5)
ALBUMIN/GLOB SERPL: 0.6 {RATIO} (ref 1.1–2.2)
ALP SERPL-CCNC: 57 U/L (ref 45–117)
ALT SERPL-CCNC: 51 U/L (ref 12–78)
AMPHET UR QL SCN: NEGATIVE
ANION GAP SERPL CALC-SCNC: 1 MMOL/L (ref 5–15)
APPEARANCE UR: CLEAR
AST SERPL-CCNC: 39 U/L (ref 15–37)
BACTERIA URNS QL MICRO: ABNORMAL /HPF
BARBITURATES UR QL SCN: NEGATIVE
BENZODIAZ UR QL: NEGATIVE
BILIRUB SERPL-MCNC: 0.4 MG/DL (ref 0.2–1)
BILIRUB UR QL: NEGATIVE
BUN SERPL-MCNC: 68 MG/DL (ref 6–20)
BUN/CREAT SERPL: 23 (ref 12–20)
CALCIUM SERPL-MCNC: 8.9 MG/DL (ref 8.5–10.1)
CANNABINOIDS UR QL SCN: NEGATIVE
CAOX CRY URNS QL MICRO: ABNORMAL
CHLORIDE SERPL-SCNC: 100 MMOL/L (ref 97–108)
CHLORIDE UR-SCNC: 33 MMOL/L
CHOLEST SERPL-MCNC: 174 MG/DL
CO2 SERPL-SCNC: 38 MMOL/L (ref 21–32)
COCAINE UR QL SCN: NEGATIVE
COLOR UR: ABNORMAL
COVID-19 RAPID TEST, COVR: NOT DETECTED
CREAT SERPL-MCNC: 3.02 MG/DL (ref 0.55–1.02)
CREAT UR-MCNC: 69 MG/DL
DRUG SCRN COMMENT,DRGCM: NORMAL
EPITH CASTS URNS QL MICRO: ABNORMAL /LPF
ERYTHROCYTE [DISTWIDTH] IN BLOOD BY AUTOMATED COUNT: 17.6 % (ref 11.5–14.5)
GLOBULIN SER CALC-MCNC: 4.4 G/DL (ref 2–4)
GLUCOSE SERPL-MCNC: 89 MG/DL (ref 65–100)
GLUCOSE UR STRIP.AUTO-MCNC: NEGATIVE MG/DL
HCT VFR BLD AUTO: 27.7 % (ref 35–47)
HDLC SERPL-MCNC: 83 MG/DL
HDLC SERPL: 2.1 {RATIO} (ref 0–5)
HGB BLD-MCNC: 8.4 G/DL (ref 11.5–16)
HGB UR QL STRIP: NEGATIVE
KETONES UR QL STRIP.AUTO: NEGATIVE MG/DL
LDLC SERPL CALC-MCNC: 81.4 MG/DL (ref 0–100)
LEUKOCYTE ESTERASE UR QL STRIP.AUTO: ABNORMAL
MAGNESIUM SERPL-MCNC: 2.3 MG/DL (ref 1.6–2.4)
MCH RBC QN AUTO: 30.5 PG (ref 26–34)
MCHC RBC AUTO-ENTMCNC: 30.3 G/DL (ref 30–36.5)
MCV RBC AUTO: 100.7 FL (ref 80–99)
METHADONE UR QL: NEGATIVE
NITRITE UR QL STRIP.AUTO: NEGATIVE
NRBC # BLD: 0 K/UL (ref 0–0.01)
NRBC BLD-RTO: 0 PER 100 WBC
OPIATES UR QL: NEGATIVE
OSMOLALITY UR: 372 MOSM/KG H2O
PCP UR QL: NEGATIVE
PH UR STRIP: 6 [PH] (ref 5–8)
PHOSPHATE SERPL-MCNC: 3.1 MG/DL (ref 2.6–4.7)
PLATELET # BLD AUTO: 152 K/UL (ref 150–400)
PMV BLD AUTO: 9.2 FL (ref 8.9–12.9)
POTASSIUM SERPL-SCNC: 4.5 MMOL/L (ref 3.5–5.1)
POTASSIUM UR-SCNC: 28 MMOL/L
PROT SERPL-MCNC: 7.1 G/DL (ref 6.4–8.2)
PROT UR STRIP-MCNC: NEGATIVE MG/DL
PROT UR-MCNC: 16 MG/DL (ref 0–11.9)
RBC # BLD AUTO: 2.75 M/UL (ref 3.8–5.2)
RBC #/AREA URNS HPF: ABNORMAL /HPF (ref 0–5)
SODIUM SERPL-SCNC: 139 MMOL/L (ref 136–145)
SODIUM UR-SCNC: 30 MMOL/L
SOURCE, COVRS: NORMAL
SP GR UR REFRACTOMETRY: 1.01 (ref 1–1.03)
TRIGL SERPL-MCNC: 48 MG/DL (ref ?–150)
UROBILINOGEN UR QL STRIP.AUTO: 0.2 EU/DL (ref 0.2–1)
VLDLC SERPL CALC-MCNC: 9.6 MG/DL
WBC # BLD AUTO: 6.1 K/UL (ref 3.6–11)
WBC URNS QL MICRO: ABNORMAL /HPF (ref 0–4)

## 2021-05-26 PROCEDURE — 84156 ASSAY OF PROTEIN URINE: CPT

## 2021-05-26 PROCEDURE — 65270000029 HC RM PRIVATE

## 2021-05-26 PROCEDURE — 74011000250 HC RX REV CODE- 250: Performed by: INTERNAL MEDICINE

## 2021-05-26 PROCEDURE — 94640 AIRWAY INHALATION TREATMENT: CPT

## 2021-05-26 PROCEDURE — 83735 ASSAY OF MAGNESIUM: CPT

## 2021-05-26 PROCEDURE — 84300 ASSAY OF URINE SODIUM: CPT

## 2021-05-26 PROCEDURE — 80061 LIPID PANEL: CPT

## 2021-05-26 PROCEDURE — 82436 ASSAY OF URINE CHLORIDE: CPT

## 2021-05-26 PROCEDURE — 85027 COMPLETE CBC AUTOMATED: CPT

## 2021-05-26 PROCEDURE — 87077 CULTURE AEROBIC IDENTIFY: CPT

## 2021-05-26 PROCEDURE — 2709999900 HC NON-CHARGEABLE SUPPLY

## 2021-05-26 PROCEDURE — 87186 SC STD MICRODIL/AGAR DIL: CPT

## 2021-05-26 PROCEDURE — 87086 URINE CULTURE/COLONY COUNT: CPT

## 2021-05-26 PROCEDURE — 74011250637 HC RX REV CODE- 250/637: Performed by: INTERNAL MEDICINE

## 2021-05-26 PROCEDURE — 94760 N-INVAS EAR/PLS OXIMETRY 1: CPT

## 2021-05-26 PROCEDURE — 80053 COMPREHEN METABOLIC PANEL: CPT

## 2021-05-26 PROCEDURE — 99231 SBSQ HOSP IP/OBS SF/LOW 25: CPT | Performed by: INTERNAL MEDICINE

## 2021-05-26 PROCEDURE — 80307 DRUG TEST PRSMV CHEM ANLYZR: CPT

## 2021-05-26 PROCEDURE — 36415 COLL VENOUS BLD VENIPUNCTURE: CPT

## 2021-05-26 PROCEDURE — 82570 ASSAY OF URINE CREATININE: CPT

## 2021-05-26 PROCEDURE — 87635 SARS-COV-2 COVID-19 AMP PRB: CPT

## 2021-05-26 PROCEDURE — 94664 DEMO&/EVAL PT USE INHALER: CPT

## 2021-05-26 PROCEDURE — 77030038269 HC DRN EXT URIN PURWCK BARD -A

## 2021-05-26 PROCEDURE — 99223 1ST HOSP IP/OBS HIGH 75: CPT | Performed by: PHYSICAL MEDICINE & REHABILITATION

## 2021-05-26 PROCEDURE — 84100 ASSAY OF PHOSPHORUS: CPT

## 2021-05-26 PROCEDURE — 84133 ASSAY OF URINE POTASSIUM: CPT

## 2021-05-26 PROCEDURE — 83935 ASSAY OF URINE OSMOLALITY: CPT

## 2021-05-26 PROCEDURE — 81001 URINALYSIS AUTO W/SCOPE: CPT

## 2021-05-26 RX ORDER — SODIUM CHLORIDE 450 MG/100ML
75 INJECTION, SOLUTION INTRAVENOUS CONTINUOUS
Status: DISCONTINUED | OUTPATIENT
Start: 2021-05-26 | End: 2021-05-27

## 2021-05-26 RX ADMIN — PANTOPRAZOLE SODIUM 40 MG: 40 TABLET, DELAYED RELEASE ORAL at 22:16

## 2021-05-26 RX ADMIN — SODIUM CHLORIDE 75 ML/HR: 4.5 INJECTION, SOLUTION INTRAVENOUS at 11:46

## 2021-05-26 RX ADMIN — ARFORMOTEROL TARTRATE 15 MCG: 15 SOLUTION RESPIRATORY (INHALATION) at 20:27

## 2021-05-26 RX ADMIN — APIXABAN 2.5 MG: 2.5 TABLET, FILM COATED ORAL at 08:42

## 2021-05-26 RX ADMIN — Medication 10 ML: at 22:16

## 2021-05-26 RX ADMIN — BUDESONIDE 500 MCG: 0.5 INHALANT RESPIRATORY (INHALATION) at 07:14

## 2021-05-26 RX ADMIN — AMLODIPINE BESYLATE 5 MG: 5 TABLET ORAL at 08:42

## 2021-05-26 RX ADMIN — SERTRALINE 25 MG: 25 TABLET, FILM COATED ORAL at 08:42

## 2021-05-26 RX ADMIN — APIXABAN 2.5 MG: 2.5 TABLET, FILM COATED ORAL at 22:15

## 2021-05-26 RX ADMIN — ATORVASTATIN CALCIUM 40 MG: 20 TABLET, FILM COATED ORAL at 22:16

## 2021-05-26 RX ADMIN — BUDESONIDE 500 MCG: 0.5 INHALANT RESPIRATORY (INHALATION) at 20:27

## 2021-05-26 RX ADMIN — SODIUM CHLORIDE 75 ML/HR: 4.5 INJECTION, SOLUTION INTRAVENOUS at 22:15

## 2021-05-26 RX ADMIN — ARFORMOTEROL TARTRATE 15 MCG: 15 SOLUTION RESPIRATORY (INHALATION) at 07:14

## 2021-05-26 RX ADMIN — Medication 10 ML: at 05:25

## 2021-05-26 NOTE — PROGRESS NOTES
Physician Progress Note      Taryn Saenz  CSN #:                  [id number]  :                       1932  ADMIT DATE:       2021 11:10 AM  DISCH DATE:  RESPONDING  PROVIDER #:        SANDER Darling MD          QUERY TEXTEstrtori Sotoerkasey Afternoon    This patient admitted for PIERRE. Cardiology was consulted and notes HFpEF. Wava Prim If possible, please document in progress notes and discharge summary further specificity regarding the type and acuity of CHF:      The medical record reflects the following:  Risk Factors: A-fib, SSS S/p pacemaker , PIERRE on CKD  Clinical Indicators: , Cards notes \" HFpEF , not in acute exacerbation, probably volume depleted from over diuresis, and poor po intake  \"  Echo with LVEF @ 50-55%. Treatment:  Diuretics held given PIERRE, cont. Juan, Card consulted, Echo    Thank you  DARSHAN Petersen,Rn,CPHQ, [facility], [place]  [phone]  Options provided:  -- Chronic Systolic CHF/HFrEF  -- Chronic Diastolic CHF/HFpEF  -- Chronic Systolic and Diastolic CHF  -- Acute on Chronic Systolic CHF/HFrEF  -- Acute on Chronic Diastolic CHF/HFpEF  -- Acute on Chronic Systolic and Diastolic CHF  -- Acute Systolic CHF/HFrEF  -- Acute Diastolic CHF/HFpEF  -- Acute Systolic and Diastolic CHF  -- Other - I will add my own diagnosis  -- Disagree - Not applicable / Not valid  -- Disagree - Clinically unable to determine / Unknown  -- Refer to Clinical Documentation Reviewer    PROVIDER RESPONSE TEXT:    This patient has chronic diastolic CHF/HFpEF.     Query created by: Estefani Anderson on 2021 3:21 PM      Electronically signed by:  Harry Mcdaniels MD 2021 3:26 PM

## 2021-05-26 NOTE — ROUTINE PROCESS
Bedside shift change report given to Emanuel Oh RN (oncoming nurse) by Brissa Keys RN (offgoing nurse). Report included the following information SBAR, Kardex, Intake/Output, MAR and Accordion.

## 2021-05-26 NOTE — PROGRESS NOTES
NEPHROLOGY PROGRESS NOTE         NAME:Anisha Quesada                   FHW:[id number]   :1932    Chief complaints: F/U PIERRE    Subjective: feels fine and no complaints    24 hr interval history: scr 3.0 from 3.5 and K is better 4.5        Objective:  Vitals:    21 0431 21 0714 21 0721 21 0739   BP: 130/76   131/73   Pulse: 83   77   Resp: 18   18   Temp: 98 °F (36.7 °C)   97.5 °F (36.4 °C)   SpO2: 95% 98% 97% 96%   Weight:       Height:           Intake/Output Summary (Last 24 hours) at 2021 1201  Last data filed at 2021 0840  Gross per 24 hour   Intake 2806.67 ml   Output 480 ml   Net 2326.67 ml       PHYSICAL EXAM:  GENERAL : Lying down in bed with no acute distress  HEENT: AT NC PEERLA   NECK: Supple no JVP  CVS: S1 S2 RRR, no murmur or gallops heard  RS: CTABL, no rhonchi,or wheezing heard  ABDOMEN: soft NT ND positive BS  EXTREMITY: No edema clubbing or cyanosis, pedal pulse +  NEUROLOGY: AAA X3, no focal deficit or asterixis      Labs:  CBC:    Lab Results   Component Value Date/Time    WBC 6.1 2021 06:07 AM    HGB 8.4 (L) 2021 06:07 AM    HCT 27.7 (L) 2021 06:07 AM     2021 06:07 AM     BMP:   Lab Results   Component Value Date/Time     2021 06:07 AM    K 4.5 2021 06:07 AM     2021 06:07 AM    CO2 38 (H) 2021 06:07 AM    AGAP 1 (L) 2021 06:07 AM    GLU 89 2021 06:07 AM    BUN 68 (H) 2021 06:07 AM    CREA 3.02 (H) 2021 06:07 AM    GFRAA 18 (L) 2021 06:07 AM    GFRNA 15 (L) 2021 06:07 AM        Lab Results   Component Value Date/Time    Color YELLOW/STRAW 2021 04:40 AM    Appearance CLEAR 2021 04:40 AM    Specific gravity 1.010 2021 04:40 AM    Specific gravity 1.020 2013 12:09 PM    pH (UA) 6.0 2021 04:40 AM    Protein Negative 2021 04:40 AM    Glucose Negative 2021 04:40 AM    Ketone Negative 2021 04:40 AM    Bilirubin Negative 05/26/2021 04:40 AM    Urobilinogen 0.2 05/26/2021 04:40 AM    Nitrites Negative 05/26/2021 04:40 AM    Leukocyte Esterase MODERATE (A) 05/26/2021 04:40 AM    Epithelial cells FEW 05/26/2021 04:40 AM    Bacteria 2+ (A) 05/26/2021 04:40 AM    WBC 10-20 05/26/2021 04:40 AM    RBC 0-5 05/26/2021 04:40 AM       Imaging study:    Assessment &Plan    PIERRE likely d/t prerenal from med's induced ( ARB and loop diuretics)  Hyperkalemia d/t PIERRE and ARB  Hypotension    Plan:  Scr down 3.0  Continue on 0.45% NS@ 75 ml/hr  Renally dose all med's  Hold ARB and Loop diuretics      Care Plan discussed with:   Medical Team    Al Sandra MD  5/26/2021    [facility]:  [email or web address]  Phuong Guillen office:  [address] [address]  [address] [place] [address]  Phone: [phone]  Fax :     [phone]     [place] office:  [address] [facility]  [place]  Phone - [phone]  Fax - [phone]

## 2021-05-26 NOTE — PROGRESS NOTES
5/26/2021  Case Management Progress Note    11:47 AM  Patient is 80year old female admitted 5/25 with acute renal failure. Patient's RUR is 19% yellow/moderate risk for readmission. Chart reviewed--patient discussed in IDR rounds this morning. She is a resident of [facility] and has been for 3 years. The plan is for her to return there once her renal function improves, maybe tomorrow or the next day. Per [facility], they need 2 rapid covid tests a day apart in order to take the patient back. I have communicated this with MD Dr. Claribel Fried and he is ordering one for today to get the process started. She will also need [facility] transportation to get back to [facility] so I will place her on [facility] for [facility]. Transition of Care Plan  1. Continue medial management/treatment of renal failure. 2. Patient will return to [facility] when ready   3. 2 covid tests 24 hours apart  4. [facility] transportation  5.  CM will continue to follow    ANGELA Gardner

## 2021-05-26 NOTE — PROGRESS NOTES
Physical Therapy  Orders received and chart reviewed. Patient has been bedbound for many years,  Is a resident at [facility]. She is a dolores to wheelchair at [facility]. She declines any activity with therapy today or while she is in the hospital.  We will complete the order.   Thank you  Segun Perea PT,DPT,NCS

## 2021-05-26 NOTE — PROGRESS NOTES
Occupational therapy note:  Orders received, chart reviewed. Patient bedbound for many years and is a resident at [facility]. Patient reports dolores transfer from bed to wheelchair. Patient declines offers for OOB to chair/ participation with therapy while here in hospital. Patient plans to return to [facility] at discharge. Will complete current orders at this time. Magda Pelayo, MS OTR/L

## 2021-05-26 NOTE — PROGRESS NOTES
Bedside and Verbal shift change report given to HOSSEIN Grant (oncoming nurse) by Faviola ORONA  (offgoing nurse).  Report included the following information SBAR, Kardex, MAR, Accordion and Recent Results.

## 2021-05-26 NOTE — PROGRESS NOTES
CARDIOLOGY PROGRESS NOTE        [address], [place], [address] [facility]  Phone [phone]; Fax [phone]          2021 9:37 AM       Admit Date:           2021  Admit Diagnosis:  Renal failure (ARF), acute on chronic ([facility] [address] [N17.9, N18.9]  :          1932   MRN:          [id number]          Impression Plan/Recommendation   HF p EF: not in acute exacerbation, probably volume depleted from overdiuresis/poor po intake  SSS s/p PPM   Hx a fib/flutter s/p cardioversion times 2  Dementia  PIERRE:           Will see prn/ [facility] follow up prn      1.diuretics held given PIERRE  2 no issues on interrogation   3. Cont eliquis  4 nephrology following    We discussed the expected course, resolution and complications of the diagnosis(es) in detail. Medication risks, benefits, costs, interactions, and alternatives were discussed as indicated. Pt personally seen and examined. Chart reviewed. Blood pressure 131/73, pulse 77, temperature 97.5 °F (36.4 °C), resp. rate 18, height 5' 4\" (1.626 m), weight 213 lb (96.6 kg), SpO2 96 %. CVS-S1-S2 present, Irr, sys murmur+    21    ECHO ADULT COMPLETE 2021     Interpretation Summary  · LV: Estimated LVEF is 50 - 55%. Normal cavity size, wall thickness and systolic function (ejection fraction normal). Wall motion: normal.  · MV: Mild mitral valve regurgitation is present. · RV: Reduced systolic function. Pacer/ICD present. · TV: Mild tricuspid valve regurgitation is present. · PV: Mild pulmonic valve regurgitation is present. · AV: Aortic valve leaflet calcification present. Signed by: Yeison Floyd MD on 2021 11:39 PM        A/P - SSS s/p PPM; Hx of Afib           PIERRE             Discussed with patient/nursing    Amber Mccarthy MD, [facility]            No intake/output data recorded.     Last 3 Recorded Weights in this Encounter    21 1126 21 1640   Weight: 96.6 kg (213 lb) 96.6 kg (213 lb)         Intake/Output Summary (Last 24 hours) at 5/26/2021 0941  Last data filed at 5/26/2021 0431  Gross per 24 hour   Intake 2566.67 ml   Output 0 ml   Net 2566.67 ml         SUBJECTIVE               Anisha Quesada reports  no complaints .       Current Facility-Administered Medications   Medication Dose Route Frequency    amLODIPine (NORVASC) tablet 5 mg  5 mg Oral DAILY    pantoprazole (PROTONIX) tablet 40 mg  40 mg Oral QHS    sertraline (ZOLOFT) tablet 25 mg  25 mg Oral DAILY    sodium chloride (NS) flush 5-40 mL  5-40 mL IntraVENous Q8H    sodium chloride (NS) flush 5-40 mL  5-40 mL IntraVENous PRN    acetaminophen (TYLENOL) tablet 650 mg  650 mg Oral Q6H PRN    Or    acetaminophen (TYLENOL) suppository 650 mg  650 mg Rectal Q6H PRN    polyethylene glycol (MIRALAX) packet 17 g  17 g Oral DAILY PRN    ondansetron (ZOFRAN ODT) tablet 4 mg  4 mg Oral Q8H PRN    Or    ondansetron (ZOFRAN) injection 4 mg  4 mg IntraVENous Q6H PRN    apixaban (ELIQUIS) tablet 2.5 mg  2.5 mg Oral BID    arformoteroL (BROVANA) neb solution 15 mcg  15 mcg Nebulization BID RT    budesonide (PULMICORT) 500 mcg/2 ml nebulizer suspension  500 mcg Nebulization BID RT    albuterol-ipratropium (DUO-NEB) 2.5 MG-0.5 MG/3 ML  3 mL Nebulization Q6H PRN    atorvastatin (LIPITOR) tablet 40 mg  40 mg Oral QHS    lactated Ringers infusion  50 mL/hr IntraVENous CONTINUOUS      OBJECTIVE               Intake/Output Summary (Last 24 hours) at 5/26/2021 0937  Last data filed at 5/26/2021 0431  Gross per 24 hour   Intake 2566.67 ml   Output 0 ml   Net 2566.67 ml       Review of Systems - History obtained from the patient AS PER  HPI        PHYSICAL EXAM        Visit Vitals  /73 (BP 1 Location: Right upper arm, BP Patient Position: At rest)   Pulse 77   Temp 97.5 °F (36.4 °C)   Resp 18   Ht 5' 4\" (1.626 m)   Wt 96.6 kg (213 lb)   SpO2 96%   BMI 36.56 kg/m²       Gen: Well-developed, well-nourished, in no acute distress  alert and oriented x 2  HEENT:  Pink conjunctivae, Hearing grossly normal.  Moist mucous membranes. OS opaque  Neck: Supple, No JVD  Resp: No accessory muscle use, Clear breath sounds, No rales or rhonchi  Card: irregular Rate, Rhythm,Normal S1, S2, No murmurs, rubs or gallop. MSK: No cyanosis or clubbing  Skin: No rashes or ulcers, no bruising  Neuro:  Cranial nerves are grossly intact, moving all four extremities, no focal deficit, follows commands appropriately  Psych:  Fair  insight, oriented to person, place and time, alert, Nml Affect  LE: No edema       DATA REVIEW      SSS s/p MEdtronic dual chamber PPM , gen change 2015,   Afib/flutter s/p DCCV 2016, 2017 on apixiban   HF p EF   HLD   L eye blindness    Cardiac monitor: off         Laboratory and Imaging have been reviewed by me and are notable for  No results for input(s): CPK, CKMB, TROIQ in the last 72 hours.   Recent Labs     05/26/21  0607 05/25/21  1131    136   K 4.5 5.4*   CO2 38* 37*   BUN 68* 77*   CREA 3.02* 3.50*   GLU 89 122*   PHOS 3.1  --    MG 2.3  --    WBC 6.1 6.4   HGB 8.4* 9.1*   HCT 27.7* 29.7*    168             Cathy Darling NP

## 2021-05-26 NOTE — ACP (ADVANCE CARE PLANNING)
Advanced care planning~    Patient is able to participate in decision making but relies on her NOK- her two children Ashley Aceves and Sim Ambrosia. Has DDNR scanned into chart, signed 2019 and confirmed DNR status this admission. Pt and Dilia's goals are to live well and have quality of life, and has her medical conditions progress- to weight risk/benefit thoughtfully. As her diseases progress, would be open to talking about a more palliative approach and even Hospice care. Goals this admission are to recover from PIERRE and return to [facility].       Primary Decision Maker: Nisha Manuel - Daughter - [phone]

## 2021-05-26 NOTE — PROGRESS NOTES
[address]ash [address]  [address] [facility], [address] [place] [address]  [phone]      Medical Progress Note      NAME: Kimberly Bhagat   :  1932  MRM:  [id number]    Date of service: 2021  10:21 AM       Assessment and Plan:   1. Renal failure (ARF), acute on chronic / Dehydration - likely due to a combination of her meds (torsemide, lisinopril) and poor PO intake. Hold those meds. Hydrate. Creatinine has improved slightly. Follow labs. US of the kidneys: suggesting medical renal disease. Multiple cysts in both kidneys. Evaluated by renal.      2. Acute metabolic encephalopathy / Dementia / Anxiety and depression / Insomnia / Polypharmacy - Continue her typical sertraline, but hold trazodone. Monitor.     3. Atrial fibrillation / Chronic anticoagulation / HTN (hypertension) / Presence of permanent cardiac pacemaker - rate stable, continue norvasc and eliquis. Hold amiodarone due to ARF. No known hx of CHF, so hold ACE and torsemide. evaluated by cardiology.      4. Hx of completed stroke / Hyperlipemia - continue plavix, eliquis and atorvastatin.       5. GERD (gastroesophageal reflux disease) - PPI     6. COPD - Continue brovana and pulmicort in place of symbicort     7. Chronic pain / Arthritis - Tylenol prn. Hold percocet due to confusion. Subjective:     Chief Complaint[de-identified] Patient was seen and examined as a follow up for ARF. Chart was reviewed. more awake and communicating     ROS:  (bold if positive, if negative)    Tolerating PT  Tolerating Diet        Objective:     Last 24hrs VS reviewed since prior progress note.  Most recent are:    Visit Vitals  /73 (BP 1 Location: Right upper arm, BP Patient Position: At rest)   Pulse 77   Temp 97.5 °F (36.4 °C)   Resp 18   Ht 5' 4\" (1.626 m)   Wt 96.6 kg (213 lb)   SpO2 96%   BMI 36.56 kg/m²     SpO2 Readings from Last 6 Encounters:   21 96%   10/10/16 96%   16 98%   13 99%   13 99%    O2 Flow Rate (L/min): 2 l/min       Intake/Output Summary (Last 24 hours) at 5/26/2021 1021  Last data filed at 5/26/2021 0431  Gross per 24 hour   Intake 2566.67 ml   Output 0 ml   Net 2566.67 ml        Physical Exam:    Gen:  Well-developed, well-nourished, in no acute distress  HEENT:  Pink conjunctivae, PERRL, hearing intact to voice, moist mucous membranes  Neck:  Supple, without masses, thyroid non-tender  Resp:  No accessory muscle use, clear breath sounds without wheezes rales or rhonchi  Card:  No murmurs, normal S1, S2 without thrills, bruits. ++ peripheral edema  Abd:  Soft, non-tender, non-distended, normoactive bowel sounds are present, no palpable organomegaly and no detectable hernias  Lymph:  No cervical or inguinal adenopathy  Musc:  No cyanosis or clubbing  Skin:  No rashes or ulcers, skin turgor is good  Neuro:  Cranial nerves are grossly intact, no focal motor weakness, follows commands appropriately  Psych:  poor insight,   __________________________________________________________________  Medications Reviewed: (see below)  Medications:     Current Facility-Administered Medications   Medication Dose Route Frequency    0.45% sodium chloride infusion  75 mL/hr IntraVENous CONTINUOUS    amLODIPine (NORVASC) tablet 5 mg  5 mg Oral DAILY    pantoprazole (PROTONIX) tablet 40 mg  40 mg Oral QHS    sertraline (ZOLOFT) tablet 25 mg  25 mg Oral DAILY    sodium chloride (NS) flush 5-40 mL  5-40 mL IntraVENous Q8H    sodium chloride (NS) flush 5-40 mL  5-40 mL IntraVENous PRN    acetaminophen (TYLENOL) tablet 650 mg  650 mg Oral Q6H PRN    Or    acetaminophen (TYLENOL) suppository 650 mg  650 mg Rectal Q6H PRN    polyethylene glycol (MIRALAX) packet 17 g  17 g Oral DAILY PRN    ondansetron (ZOFRAN ODT) tablet 4 mg  4 mg Oral Q8H PRN    Or    ondansetron (ZOFRAN) injection 4 mg  4 mg IntraVENous Q6H PRN    apixaban (ELIQUIS) tablet 2.5 mg  2.5 mg Oral BID    arformoteroL (BROVANA) neb solution 15 mcg  15 mcg Nebulization BID RT    budesonide (PULMICORT) 500 mcg/2 ml nebulizer suspension  500 mcg Nebulization BID RT    albuterol-ipratropium (DUO-NEB) 2.5 MG-0.5 MG/3 ML  3 mL Nebulization Q6H PRN    atorvastatin (LIPITOR) tablet 40 mg  40 mg Oral QHS        Lab Data Reviewed: (see below)  Lab Review:     Recent Labs     05/26/21  0607 05/25/21  1131   WBC 6.1 6.4   HGB 8.4* 9.1*   HCT 27.7* 29.7*    168     Recent Labs     05/26/21  0607 05/25/21  1131    136   K 4.5 5.4*    97   CO2 38* 37*   GLU 89 122*   BUN 68* 77*   CREA 3.02* 3.50*   CA 8.9 9.2   MG 2.3  --    PHOS 3.1  --    ALB 2.7* 2.9*   TBILI 0.4 0.4   ALT 51 50     No results found for: GLUCPOC  No results for input(s): PH, PCO2, PO2, HCO3, FIO2 in the last 72 hours. No results for input(s): INR, INREXT in the last 72 hours. All Micro Results     Procedure Component Value Units Date/Time    CULTURE, URINE [[id number]] Collected: 05/26/21 0440    Order Status: Completed Specimen: Urine from Clean catch Updated: 05/26/21 0847    MRSA CULTURE [[id number]] Collected: 05/26/21 0600    Order Status: Completed Specimen: Nasal from Nares Updated: 05/26/21 2734          I have reviewed notes of prior 24hr. Other pertinent lab: Total time spent with patient: 28 I personally reviewed chart, notes, data and current medications in the medical record. I have personally examined and treated the patient at bedside during this period.                  Care Plan discussed with: Patient, Nursing Staff and >50% of time spent in counseling and coordination of care    Discussed:  Care Plan    Prophylaxis:  eliquis    Disposition:  SNF/LTC           ___________________________________________________    Attending Physician: Iraida Gómez MD

## 2021-05-26 NOTE — CONSULTS
Palliative Medicine Consult  [place]: [phone] ([phone])    Patient Name: Alberto Rico  YOB: 1932    Date of Initial Consult: 5/26/21  Reason for Consult: Care decisions   Requesting Provider: Claudia Nina  Primary Care Physician: Salas Loyola MD     SUMMARY:   Alberto Rico is a 80 y.o. with a past history of memory deficits (no formal dx of dementia), paroxysmal a fib on Eliquis s/p pacemaker/ICD ([facility]), HFpEF who was admitted on 5/25/2021 from [facility] with hyperkalemia and PIERRE. Has had recent back and leg injuries s/p falls (past year) and scared to do therapies, thus minimal mobility recently. Renal and cardiology consulted, gently hydrating- PIERRE likely from medications and dehydration. Retroperitoneal ultrasound - no obstruction, shows medical renal disease. Current medical issues leading to Palliative Medicine involvement include:care decisions. Social: Pt has lived at [facility] since 2018. Uses dolores lift and w/c. In bed a lot. 2 children- dtr Yeni Martin lives here and is primary decision maker. Son Terra lives in [address] [facility]. Gets most of her care at [address] [place] [address]. 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 PALLIATIVE DIAGNOSES:   1. Memory deficits- maybe baseline mild dementia, on top of metabolic encephalopathy   2. Debility- max A, fear of falling   3. Goals of care       PLAN:   1. Meet w/ pt- very pleasant, conversational and alert to person, place, year however is confused about medical issues and tells me that she relies on her dtr Yeni Martin who is local to help w/ things. She does tell me that she uses a dolores lift at [facility], as she is afraid of falling. She asks me to put up the 4th guardrail in bed - I tell her that we usually do not do this, as is a form of restraint- however she acknowledges this but still asks, as she is fearful of falling. 2. Speak to Yeni Martin, who is up to date on medical issues.  Pt gets most care at [facility], and on average is hospitalized once a year- but was there 3 months ago after ankle injury and also has been admitted in past for fluid overload. 3. We talk about current issues- gentle hydration for PIERRE, holding her lisinopril and torsemide- as we also ensure she does not get SOB or have edema. Renal function improving. 4. Dtr understanding of the delicate balance between hydration and PIERRE- has been on a fluid restriction in the past for her CHF. 5. There will be a point where we cannot balance renal/cardiac function well and pt will have more admissions- which we usually always see at some point when someone has HF even if they adhere to all medical recommendations. Pt to recover from this admission, will need to see how she stabilizes. 6. Talk about future goals of care questions that may come up- given her age and debility, would not recommend things like dialysis for example - once again in the future. Dtr agrees and would be open to talking about Hospice in the future as pt declines. 7. Pt w/ DDNR on file and she herself confirms this w/ me.   8. Pt making gains, anticipate that she may be able to get back to [facility] in several days- dtr to have ongoing conversations w/ pt and medical team there. She has declined functionally significantly over past year after fall. 9. Goals are to recover from acute issues, continue conversations about care goals as we know more about pt's new baseline. 10. Initial consult note routed to primary continuity provider and/or primary health care team members  11. Communicated plan of care with: Palliative [facility] [address] Team     GOALS OF CARE / TREATMENT PREFERENCES:     GOALS OF CARE:  Patient/Health Care Proxy Stated Goals:  Other (comment) (To live well)    TREATMENT PREFERENCES:   Code Status: DNR    Advance Care Planning:  [x] The Awareness Card Interdisciplinary Team has updated the ACP Navigator with [employer] and Patient Capacity      Primary Decision Maker: Donita Monterroso - Daughter - [phone]    Advance Care Planning 5/25/2021   Confirm Advance Directive None       Medical Interventions: Limited additional interventions             Other:    As far as possible, the palliative care team has discussed with patient / health care proxy about goals of care / treatment preferences for patient. HISTORY:     History obtained from: Pt, chart, family, staff     CHIEF COMPLAINT: \"Can you put up that guardrail? \"    HPI/SUBJECTIVE:    The patient is:   [x] Verbal and participatory  [] Non-participatory due to:     Pt very pleasant, denies current pain. Fearful of falling although she is lying in bed and not trying to get out. Ate breakfast.     Clinical Pain Assessment (nonverbal scale for severity on nonverbal patients):   Clinical Pain Assessment  Severity: 0          Duration: for how long has pt been experiencing pain (e.g., 2 days, 1 month, years)  Frequency: how often pain is an issue (e.g., several times per day, once every few days, constant)     FUNCTIONAL ASSESSMENT:     Palliative Performance Scale (PPS):  PPS: 40       PSYCHOSOCIAL/SPIRITUAL SCREENING:     Palliative IDT has assessed this patient for cultural preferences / practices and a referral made as appropriate to needs (Cultural Services, Patient Advocacy, Ethics, etc.)    Any spiritual / Scientologist concerns:  [] Yes /  [x] No    Caregiver Burnout:  [] Yes /  [x] No /  [] No Caregiver Present      Anticipatory grief assessment:   [x] Normal  / [] Maladaptive       ESAS Anxiety: Anxiety: 0    ESAS Depression: Depression: 0        REVIEW OF SYSTEMS:     Positive and pertinent negative findings in ROS are noted above in HPI. The following systems were [x] reviewed / [] unable to be reviewed as noted in HPI  Other findings are noted below.   Systems: constitutional, ears/nose/mouth/throat, respiratory, gastrointestinal, genitourinary, musculoskeletal, integumentary, neurologic, psychiatric, endocrine. Positive findings noted below. Modified ESAS Completed by: provider   Fatigue: 8 Drowsiness: 0   Depression: 0 Pain: 0   Anxiety: 0 Nausea: 0   Anorexia: 0 Dyspnea: 0           Stool Occurrence(s): 0        PHYSICAL EXAM:     From RN flowsheet:  Wt Readings from Last 3 Encounters:   05/25/21 213 lb (96.6 kg)   10/10/16 213 lb (96.6 kg)   08/24/16 212 lb 12.8 oz (96.5 kg)     Blood pressure 131/73, pulse 77, temperature 97.5 °F (36.4 °C), resp. rate 18, height 5' 4\" (1.626 m), weight 213 lb (96.6 kg), SpO2 96 %. Pain Scale 1: Numeric (0 - 10)  Pain Intensity 1: 0                 Last bowel movement, if known:     Constitutional: awake, alert, oriented to person, place, year. Able to hold good conversation.    Eyes: pupils equal, anicteric  ENMT: no nasal discharge, moist mucous membranes  Respiratory: breathing not labored  Neurologic: following commands, moving all extremities  Psychiatric: full affect,     HISTORY:     Active Problems:    HTN (hypertension) (6/25/2013)      Presence of permanent cardiac pacemaker (6/26/2013)      Renal failure (ARF), acute on chronic ([address] (5/25/2021)      Hypertension ()      Hyperlipemia ()      Hx of completed stroke ()      GERD (gastroesophageal reflux disease) ()      Dementia (HCC) ()      Hyperkalemia (5/25/2021)      Dehydration (5/25/2021)      Past Medical History:   Diagnosis Date    Anxiety and depression     Dementia ([address]     GERD (gastroesophageal reflux disease)     Hx of completed stroke     Hyperlipemia     Hypertension     Obese     Paroxysmal A-fib ([address]     Polypharmacy       Past Surgical History:   Procedure Laterality Date    HX HYSTERECTOMY      HX ORTHOPAEDIC  2011    right knee replacement    HX ORTHOPAEDIC  2009    left knee replacement    HX ORTHOPAEDIC  2011    right bunion    HX ORTHOPAEDIC      left rotator cuff    HX PACEMAKER  2006    pacemaker       Family History   Problem Relation Age of Onset    Cancer Mother         colon cancer    Heart Disease Father     Cancer Sister         breast    Cancer Brother         liver     Malignant Hyperthermia Neg Hx     Pseudocholinesterase Deficiency Neg Hx     Delayed Awakening Neg Hx     Post-op Nausea/Vomiting Neg Hx     Emergence Delirium Neg Hx     Post-op Cognitive Dysfunction Neg Hx       History reviewed, no pertinent family history.   Social History     Tobacco Use    Smoking status: Never Smoker    Smokeless tobacco: Never Used   Substance Use Topics    Alcohol use: No     No Known Allergies   Current Facility-Administered Medications   Medication Dose Route Frequency    0.45% sodium chloride infusion  75 mL/hr IntraVENous CONTINUOUS    amLODIPine (NORVASC) tablet 5 mg  5 mg Oral DAILY    pantoprazole (PROTONIX) tablet 40 mg  40 mg Oral QHS    sertraline (ZOLOFT) tablet 25 mg  25 mg Oral DAILY    sodium chloride (NS) flush 5-40 mL  5-40 mL IntraVENous Q8H    sodium chloride (NS) flush 5-40 mL  5-40 mL IntraVENous PRN    acetaminophen (TYLENOL) tablet 650 mg  650 mg Oral Q6H PRN    Or    acetaminophen (TYLENOL) suppository 650 mg  650 mg Rectal Q6H PRN    polyethylene glycol (MIRALAX) packet 17 g  17 g Oral DAILY PRN    ondansetron (ZOFRAN ODT) tablet 4 mg  4 mg Oral Q8H PRN    Or    ondansetron (ZOFRAN) injection 4 mg  4 mg IntraVENous Q6H PRN    apixaban (ELIQUIS) tablet 2.5 mg  2.5 mg Oral BID    arformoteroL (BROVANA) neb solution 15 mcg  15 mcg Nebulization BID RT    budesonide (PULMICORT) 500 mcg/2 ml nebulizer suspension  500 mcg Nebulization BID RT    albuterol-ipratropium (DUO-NEB) 2.5 MG-0.5 MG/3 ML  3 mL Nebulization Q6H PRN    atorvastatin (LIPITOR) tablet 40 mg  40 mg Oral QHS          LAB AND IMAGING FINDINGS:     Lab Results   Component Value Date/Time    WBC 6.1 05/26/2021 06:07 AM    HGB 8.4 (L) 05/26/2021 06:07 AM    PLATELET 456 91/17/0350 06:07 AM     Lab Results   Component Value Date/Time    Sodium 139 05/26/2021 06:07 AM    Potassium 4.5 05/26/2021 06:07 AM    Chloride 100 05/26/2021 06:07 AM    CO2 38 (H) 05/26/2021 06:07 AM    BUN 68 (H) 05/26/2021 06:07 AM    Creatinine 3.02 (H) 05/26/2021 06:07 AM    Calcium 8.9 05/26/2021 06:07 AM    Magnesium 2.3 05/26/2021 06:07 AM    Phosphorus 3.1 05/26/2021 06:07 AM      Lab Results   Component Value Date/Time    Alk. phosphatase 57 05/26/2021 06:07 AM    Protein, total 7.1 05/26/2021 06:07 AM    Albumin 2.7 (L) 05/26/2021 06:07 AM    Globulin 4.4 (H) 05/26/2021 06:07 AM     Lab Results   Component Value Date/Time    INR 1.0 08/22/2013 12:28 PM    Prothrombin time 10.9 08/22/2013 12:28 PM    aPTT 26.2 06/20/2013 12:05 PM      Lab Results   Component Value Date/Time    Iron 50 05/25/2021 11:31 AM    TIBC 266 05/25/2021 11:31 AM    Iron % saturation 19 (L) 05/25/2021 11:31 AM    Ferritin 160 05/25/2021 11:31 AM      No results found for: PH, PCO2, PO2  No components found for: GLPOC   No results found for: CPK, CKMB             Total time: 70 min   Counseling / coordination time, spent as noted above: 50 min   > 50% counseling / coordination?: yes    Prolonged service was provided for  []30 min   []75 min in face to face time in the presence of the patient, spent as noted above. Time Start:   Time End:   Note: this can only be billed with 19471 (initial) or 45423 (follow up). If multiple start / stop times, list each separately.

## 2021-05-27 LAB
ALBUMIN SERPL-MCNC: 2.6 G/DL (ref 3.5–5)
ANION GAP SERPL CALC-SCNC: 3 MMOL/L (ref 5–15)
BACTERIA SPEC CULT: NORMAL
BACTERIA SPEC CULT: NORMAL
BASOPHILS # BLD: 0 K/UL (ref 0–0.1)
BASOPHILS NFR BLD: 0 % (ref 0–1)
BUN SERPL-MCNC: 60 MG/DL (ref 6–20)
BUN/CREAT SERPL: 22 (ref 12–20)
CALCIUM SERPL-MCNC: 8.7 MG/DL (ref 8.5–10.1)
CHLORIDE SERPL-SCNC: 101 MMOL/L (ref 97–108)
CO2 SERPL-SCNC: 34 MMOL/L (ref 21–32)
COVID-19 RAPID TEST, COVR: NOT DETECTED
CREAT SERPL-MCNC: 2.79 MG/DL (ref 0.55–1.02)
DIFFERENTIAL METHOD BLD: ABNORMAL
EOSINOPHIL # BLD: 0.2 K/UL (ref 0–0.4)
EOSINOPHIL NFR BLD: 2 % (ref 0–7)
ERYTHROCYTE [DISTWIDTH] IN BLOOD BY AUTOMATED COUNT: 17.7 % (ref 11.5–14.5)
GLUCOSE SERPL-MCNC: 78 MG/DL (ref 65–100)
HCT VFR BLD AUTO: 26.1 % (ref 35–47)
HGB BLD-MCNC: 8 G/DL (ref 11.5–16)
IMM GRANULOCYTES # BLD AUTO: 0.1 K/UL (ref 0–0.04)
IMM GRANULOCYTES NFR BLD AUTO: 1 % (ref 0–0.5)
LYMPHOCYTES # BLD: 0.7 K/UL (ref 0.8–3.5)
LYMPHOCYTES NFR BLD: 9 % (ref 12–49)
MCH RBC QN AUTO: 30.9 PG (ref 26–34)
MCHC RBC AUTO-ENTMCNC: 30.7 G/DL (ref 30–36.5)
MCV RBC AUTO: 100.8 FL (ref 80–99)
MONOCYTES # BLD: 0.6 K/UL (ref 0–1)
MONOCYTES NFR BLD: 9 % (ref 5–13)
NEUTS SEG # BLD: 5.5 K/UL (ref 1.8–8)
NEUTS SEG NFR BLD: 78 % (ref 32–75)
NRBC # BLD: 0 K/UL (ref 0–0.01)
NRBC BLD-RTO: 0 PER 100 WBC
PHOSPHATE SERPL-MCNC: 2.9 MG/DL (ref 2.6–4.7)
PLATELET # BLD AUTO: 151 K/UL (ref 150–400)
PMV BLD AUTO: 9.1 FL (ref 8.9–12.9)
POTASSIUM SERPL-SCNC: 4.4 MMOL/L (ref 3.5–5.1)
RBC # BLD AUTO: 2.59 M/UL (ref 3.8–5.2)
SERVICE CMNT-IMP: NORMAL
SODIUM SERPL-SCNC: 138 MMOL/L (ref 136–145)
SOURCE, COVRS: NORMAL
WBC # BLD AUTO: 7 K/UL (ref 3.6–11)

## 2021-05-27 PROCEDURE — 36415 COLL VENOUS BLD VENIPUNCTURE: CPT

## 2021-05-27 PROCEDURE — 74011250636 HC RX REV CODE- 250/636: Performed by: INTERNAL MEDICINE

## 2021-05-27 PROCEDURE — 94664 DEMO&/EVAL PT USE INHALER: CPT

## 2021-05-27 PROCEDURE — 87635 SARS-COV-2 COVID-19 AMP PRB: CPT

## 2021-05-27 PROCEDURE — 85025 COMPLETE CBC W/AUTO DIFF WBC: CPT

## 2021-05-27 PROCEDURE — 94640 AIRWAY INHALATION TREATMENT: CPT

## 2021-05-27 PROCEDURE — 94760 N-INVAS EAR/PLS OXIMETRY 1: CPT

## 2021-05-27 PROCEDURE — 74011000250 HC RX REV CODE- 250: Performed by: INTERNAL MEDICINE

## 2021-05-27 PROCEDURE — 80069 RENAL FUNCTION PANEL: CPT

## 2021-05-27 PROCEDURE — 74011250637 HC RX REV CODE- 250/637: Performed by: INTERNAL MEDICINE

## 2021-05-27 PROCEDURE — 77010033678 HC OXYGEN DAILY

## 2021-05-27 PROCEDURE — 65270000029 HC RM PRIVATE

## 2021-05-27 RX ORDER — FUROSEMIDE 10 MG/ML
40 INJECTION INTRAMUSCULAR; INTRAVENOUS ONCE
Status: COMPLETED | OUTPATIENT
Start: 2021-05-27 | End: 2021-05-27

## 2021-05-27 RX ADMIN — ATORVASTATIN CALCIUM 40 MG: 20 TABLET, FILM COATED ORAL at 21:55

## 2021-05-27 RX ADMIN — ARFORMOTEROL TARTRATE 15 MCG: 15 SOLUTION RESPIRATORY (INHALATION) at 19:15

## 2021-05-27 RX ADMIN — Medication 10 ML: at 15:25

## 2021-05-27 RX ADMIN — APIXABAN 2.5 MG: 2.5 TABLET, FILM COATED ORAL at 08:15

## 2021-05-27 RX ADMIN — CEFTRIAXONE 1 G: 1 INJECTION, POWDER, FOR SOLUTION INTRAMUSCULAR; INTRAVENOUS at 12:06

## 2021-05-27 RX ADMIN — APIXABAN 2.5 MG: 2.5 TABLET, FILM COATED ORAL at 21:55

## 2021-05-27 RX ADMIN — BUDESONIDE 500 MCG: 0.5 INHALANT RESPIRATORY (INHALATION) at 19:15

## 2021-05-27 RX ADMIN — AMLODIPINE BESYLATE 5 MG: 5 TABLET ORAL at 08:15

## 2021-05-27 RX ADMIN — SERTRALINE 25 MG: 25 TABLET, FILM COATED ORAL at 08:15

## 2021-05-27 RX ADMIN — ARFORMOTEROL TARTRATE 15 MCG: 15 SOLUTION RESPIRATORY (INHALATION) at 07:14

## 2021-05-27 RX ADMIN — Medication 10 ML: at 06:22

## 2021-05-27 RX ADMIN — BUDESONIDE 500 MCG: 0.5 INHALANT RESPIRATORY (INHALATION) at 07:15

## 2021-05-27 RX ADMIN — Medication 10 ML: at 21:55

## 2021-05-27 RX ADMIN — PANTOPRAZOLE SODIUM 40 MG: 40 TABLET, DELAYED RELEASE ORAL at 21:55

## 2021-05-27 RX ADMIN — FUROSEMIDE 40 MG: 10 INJECTION, SOLUTION INTRAMUSCULAR; INTRAVENOUS at 10:54

## 2021-05-27 NOTE — PROGRESS NOTES
NEPHROLOGY PROGRESS NOTE         NAME:Anisha Quesada                   QCW:[id number]   :1932    Chief complaints: F/U PIERRE    Subjective: c/o SOB    24 hr interval history: scr 2.8 from 3.0 and K is better 4.5        Objective:  Vitals:    21 0326 21 0715 21 0719 21 0743   BP: 129/69   138/70   Pulse: 83   82   Resp: 16   16   Temp: 98.6 °F (37 °C)   97.8 °F (36.6 °C)   SpO2: 93% 98% 100% 99%   Weight:       Height:           Intake/Output Summary (Last 24 hours) at 2021 1030  Last data filed at 2021 0815  Gross per 24 hour   Intake 3562.92 ml   Output 1960 ml   Net 1602.92 ml       PHYSICAL EXAM:  GENERAL : Lying down in bed with no acute distress  HEENT: AT NC PEERLA   NECK: Supple no JVP  CVS: S1 S2 RRR, no murmur or gallops heard  RS: diminished BS + Crackle +  ABDOMEN: soft NT ND positive BS  EXTREMITY: trace edema clubbing or cyanosis, pedal pulse +  NEUROLOGY: AAA X3, no focal deficit or asterixis      Labs:  CBC:    Lab Results   Component Value Date/Time    WBC 7.0 2021 03:46 AM    HGB 8.0 (L) 2021 03:46 AM    HCT 26.1 (L) 2021 03:46 AM     2021 03:46 AM     BMP:   Lab Results   Component Value Date/Time     2021 03:46 AM    K 4.4 2021 03:46 AM     2021 03:46 AM    CO2 34 (H) 2021 03:46 AM    AGAP 3 (L) 2021 03:46 AM    GLU 78 2021 03:46 AM    BUN 60 (H) 2021 03:46 AM    CREA 2.79 (H) 2021 03:46 AM    GFRAA 19 (L) 2021 03:46 AM    GFRNA 16 (L) 2021 03:46 AM        Lab Results   Component Value Date/Time    Color YELLOW/STRAW 2021 04:40 AM    Appearance CLEAR 2021 04:40 AM    Specific gravity 1.010 2021 04:40 AM    Specific gravity 1.020 2013 12:09 PM    pH (UA) 6.0 2021 04:40 AM    Protein Negative 2021 04:40 AM    Glucose Negative 2021 04:40 AM    Ketone Negative 2021 04:40 AM    Bilirubin Negative 2021 04:40 AM Urobilinogen 0.2 05/26/2021 04:40 AM    Nitrites Negative 05/26/2021 04:40 AM    Leukocyte Esterase MODERATE (A) 05/26/2021 04:40 AM    Epithelial cells FEW 05/26/2021 04:40 AM    Bacteria 2+ (A) 05/26/2021 04:40 AM    WBC 10-20 05/26/2021 04:40 AM    RBC 0-5 05/26/2021 04:40 AM       Imaging study:    Assessment &Plan    PIERRE likely d/t prerenal from med's induced ( ARB and loop diuretics)  Hyperkalemia d/t PIERRE and ARB  Hypotension    Plan:  Scr down 2.8  Clinically she looks hypervolemic  DC IVF and give Lasix 40 mg IV x1  Renally dose all med's  Hold ARB       Care Plan discussed with:   Medical Team    Al Oliver MD  5/27/2021    [facility]:  [email or web address]  Alexis Brumfield office:  [address] [place] [address], [address] [place] [address],[address]  [address]  Phone: [phone]  Fax :     [phone]     [place] office:  [address] [facility] [place], [address]  Phone - [phone]  Fax - [phone]

## 2021-05-27 NOTE — PROGRESS NOTES
Bedside and Verbal shift change report given to HOSSEIN Grant (oncoming nurse) by Jonathan Obrien (offgoing nurse). Report included the following information SBAR, Intake/Output and Recent Results.

## 2021-05-27 NOTE — PROGRESS NOTES
5/27/2021  Case Management Progress Note    5:18 PM  Got a voicemail from [employer], she said to start a return referral in [employer] so I did. I did not have time to fax clinicals so they may need those tomorrow but the referral is out there. ANGELA Zaman    3:33 PM  Left another message for Mariann Gómez at [phone] ANGELA Rhodes    11:35 AM  Patient is 80year old female admitted 5/25 with acute renal failure. Patient's RUR is 20% yellow/moderate risk for readmission. Chart reviewed--patient discussed in IDR rounds this morning. Noted from rounds this morning that patient may be ready for discharge later today or tomorrow depending on her creatinine. I have left a message at [facility] letting them know about this status, let them know also to call me back and let me know what they need from me. Transition of Care Plan  1. Continue medical management/treatment  2. Plan is to go back to [facility]  3. [facility] for transportation, will place on will call  4.  CM will continue to follow    ANGELA Zaman

## 2021-05-27 NOTE — PROGRESS NOTES
Arash Pierson [facility] [address]  [address] [facility], [address] [place] [address]  [phone]      Medical Progress Note      NAME: Mac Ordoñez   :  1932  MRM:  [id number]    Date of service: 2021  10:21 AM       Assessment and Plan:   1. Renal failure (ARF), acute on chronic / Dehydration - likely due to a combination of her meds (torsemide, lisinopril) and poor PO intake. Hold those meds. Hydrate. Creatinine continue to improve. Follow labs. US of the kidneys: suggesting medical renal disease. Multiple cysts in both kidneys. Evaluated by renal.      2. Acute metabolic encephalopathy / Dementia / Anxiety and depression / Insomnia / Polypharmacy - Continue her typical sertraline, but hold trazodone. Monitor.     3. Atrial fibrillation / Chronic anticoagulation / HTN (hypertension) / Presence of permanent cardiac pacemaker - rate stable, continue norvasc and eliquis. Hold amiodarone due to ARF. No known hx of CHF, so hold ACE and torsemide. evaluated by cardiology.      4. Hx of completed stroke / Hyperlipemia - continue plavix, eliquis and atorvastatin.       5. GERD (gastroesophageal reflux disease) - PPI     6. COPD - Continue brovana and pulmicort in place of symbicort     7. Chronic pain / Arthritis - Tylenol prn. Hold percocet due to confusion. 8.  UTI(POA). UC: GNR. Start ceftriaxone             Subjective:     Chief Complaint[de-identified] Patient was seen and examined as a follow up for ARF. Chart was reviewed. looks better. More awake     ROS:  (bold if positive, if negative)    Tolerating PT  Tolerating Diet        Objective:     Last 24hrs VS reviewed since prior progress note.  Most recent are:    Visit Vitals  /70 (BP 1 Location: Right lower arm, BP Patient Position: At rest)   Pulse 82   Temp 97.8 °F (36.6 °C)   Resp 16   Ht 5' 4\" (1.626 m)   Wt 89.6 kg (197 lb 8.5 oz)   SpO2 99%   BMI 33.91 kg/m²     SpO2 Readings from Last 6 Encounters:   21 99%   10/10/16 96% 08/24/16 98%   08/25/13 99%   06/20/13 99%    O2 Flow Rate (L/min): 2 l/min       Intake/Output Summary (Last 24 hours) at 5/27/2021 0914  Last data filed at 5/27/2021 0334  Gross per 24 hour   Intake 2731.67 ml   Output 1510 ml   Net 1221.67 ml        Physical Exam:    Gen:  Well-developed, well-nourished, in no acute distress  HEENT:  Pink conjunctivae, PERRL, hearing intact to voice, moist mucous membranes  Neck:  Supple, without masses, thyroid non-tender  Resp:  No accessory muscle use, clear breath sounds without wheezes rales or rhonchi  Card:  No murmurs, normal S1, S2 without thrills, bruits. ++ peripheral edema  Abd:  Soft, non-tender, non-distended, normoactive bowel sounds are present, no palpable organomegaly and no detectable hernias  Lymph:  No cervical or inguinal adenopathy  Musc:  No cyanosis or clubbing  Skin:  No rashes or ulcers, skin turgor is good  Neuro:  Cranial nerves are grossly intact, no focal motor weakness, follows commands appropriately  Psych:  poor insight,   __________________________________________________________________  Medications Reviewed: (see below)  Medications:     Current Facility-Administered Medications   Medication Dose Route Frequency    0.45% sodium chloride infusion  75 mL/hr IntraVENous CONTINUOUS    amLODIPine (NORVASC) tablet 5 mg  5 mg Oral DAILY    pantoprazole (PROTONIX) tablet 40 mg  40 mg Oral QHS    sertraline (ZOLOFT) tablet 25 mg  25 mg Oral DAILY    sodium chloride (NS) flush 5-40 mL  5-40 mL IntraVENous Q8H    sodium chloride (NS) flush 5-40 mL  5-40 mL IntraVENous PRN    acetaminophen (TYLENOL) tablet 650 mg  650 mg Oral Q6H PRN    Or    acetaminophen (TYLENOL) suppository 650 mg  650 mg Rectal Q6H PRN    polyethylene glycol (MIRALAX) packet 17 g  17 g Oral DAILY PRN    ondansetron (ZOFRAN ODT) tablet 4 mg  4 mg Oral Q8H PRN    Or    ondansetron (ZOFRAN) injection 4 mg  4 mg IntraVENous Q6H PRN    apixaban (ELIQUIS) tablet 2.5 mg  2.5 mg Oral BID    arformoteroL (BROVANA) neb solution 15 mcg  15 mcg Nebulization BID RT    budesonide (PULMICORT) 500 mcg/2 ml nebulizer suspension  500 mcg Nebulization BID RT    albuterol-ipratropium (DUO-NEB) 2.5 MG-0.5 MG/3 ML  3 mL Nebulization Q6H PRN    atorvastatin (LIPITOR) tablet 40 mg  40 mg Oral QHS        Lab Data Reviewed: (see below)  Lab Review:     Recent Labs     05/27/21  0346 05/26/21  0607 05/25/21  1131   WBC 7.0 6.1 6.4   HGB 8.0* 8.4* 9.1*   HCT 26.1* 27.7* 29.7*    152 168     Recent Labs     05/27/21 0346 05/26/21  0607 05/25/21  1131    139 136   K 4.4 4.5 5.4*    100 97   CO2 34* 38* 37*   GLU 78 89 122*   BUN 60* 68* 77*   CREA 2.79* 3.02* 3.50*   CA 8.7 8.9 9.2   MG  --  2.3  --    PHOS 2.9 3.1  --    ALB 2.6* 2.7* 2.9*   TBILI  --  0.4 0.4   ALT  --  51 50     No results found for: GLUCPOC  No results for input(s): PH, PCO2, PO2, HCO3, FIO2 in the last 72 hours. No results for input(s): INR, INREXT, INREXT in the last 72 hours. All Micro Results     Procedure Component Value Units Date/Time    COVID-19 RAPID TEST [[id number]] Collected: 05/26/21 1211    Order Status: Completed Specimen: Nasopharyngeal Updated: 05/26/21 1252     Specimen source Nasopharyngeal        COVID-19 rapid test Not detected        Comment: Rapid Abbott ID Now       Rapid NAAT:  The specimen is NEGATIVE for SARS-CoV-2, the novel coronavirus associated with COVID-19. Negative results should be treated as presumptive and, if inconsistent with clinical signs and symptoms or necessary for patient management, should be tested with an alternative molecular assay. Negative results do not preclude SARS-CoV-2 infection and should not be used as the sole basis for patient management decisions. This test has been authorized by the FDA under an Emergency Use Authorization (EUA) for use by authorized laboratories.    Fact sheet for Healthcare Providers: ConventionUpdate.co.nz  Fact sheet for Patients: ConventionUpdate.co.nz       Methodology: Isothermal Nucleic Acid Amplification         CULTURE, URINE [[id number]] Collected: 05/26/21 0440    Order Status: Completed Specimen: Urine from Clean catch Updated: 05/26/21 0847    MRSA CULTURE [[id number]] Collected: 05/26/21 0600    Order Status: Completed Specimen: Nasal from Nares Updated: 05/26/21 3807          I have reviewed notes of prior 24hr. Other pertinent lab: Total time spent with patient: 28 I personally reviewed chart, notes, data and current medications in the medical record. I have personally examined and treated the patient at bedside during this period.                  Care Plan discussed with: Patient, Nursing Staff and >50% of time spent in counseling and coordination of care    Discussed:  Care Plan    Prophylaxis:  eliquis    Disposition:  SNF/LTC           ___________________________________________________    Attending Physician: Cherelle Omalley MD

## 2021-05-27 NOTE — ROUTINE PROCESS
Bedside shift change report given to Jesse Breaux RN (oncoming nurse) by Frankie Tubbs (offgoing nurse). Report included the following information SBAR, Kardex, Intake/Output, MAR and Accordion.

## 2021-05-28 LAB
ALBUMIN SERPL-MCNC: 2.6 G/DL (ref 3.5–5)
ANION GAP SERPL CALC-SCNC: 2 MMOL/L (ref 5–15)
BUN SERPL-MCNC: 56 MG/DL (ref 6–20)
BUN/CREAT SERPL: 21 (ref 12–20)
CALCIUM SERPL-MCNC: 8.8 MG/DL (ref 8.5–10.1)
CHLORIDE SERPL-SCNC: 103 MMOL/L (ref 97–108)
CO2 SERPL-SCNC: 34 MMOL/L (ref 21–32)
CREAT SERPL-MCNC: 2.73 MG/DL (ref 0.55–1.02)
GLUCOSE SERPL-MCNC: 87 MG/DL (ref 65–100)
PHOSPHATE SERPL-MCNC: 2.9 MG/DL (ref 2.6–4.7)
POTASSIUM SERPL-SCNC: 4.2 MMOL/L (ref 3.5–5.1)
SODIUM SERPL-SCNC: 139 MMOL/L (ref 136–145)

## 2021-05-28 PROCEDURE — 36415 COLL VENOUS BLD VENIPUNCTURE: CPT

## 2021-05-28 PROCEDURE — 74011000250 HC RX REV CODE- 250: Performed by: INTERNAL MEDICINE

## 2021-05-28 PROCEDURE — 74011250637 HC RX REV CODE- 250/637: Performed by: INTERNAL MEDICINE

## 2021-05-28 PROCEDURE — 94760 N-INVAS EAR/PLS OXIMETRY 1: CPT

## 2021-05-28 PROCEDURE — 77010033678 HC OXYGEN DAILY

## 2021-05-28 PROCEDURE — 77030038269 HC DRN EXT URIN PURWCK BARD -A

## 2021-05-28 PROCEDURE — 65270000029 HC RM PRIVATE

## 2021-05-28 PROCEDURE — 94640 AIRWAY INHALATION TREATMENT: CPT

## 2021-05-28 PROCEDURE — 74011250636 HC RX REV CODE- 250/636: Performed by: INTERNAL MEDICINE

## 2021-05-28 PROCEDURE — 80069 RENAL FUNCTION PANEL: CPT

## 2021-05-28 PROCEDURE — 77030029684 HC NEB SM VOL KT MONA -A

## 2021-05-28 RX ORDER — TORSEMIDE 20 MG/1
20 TABLET ORAL DAILY
Status: DISCONTINUED | OUTPATIENT
Start: 2021-05-28 | End: 2021-05-29 | Stop reason: HOSPADM

## 2021-05-28 RX ADMIN — ARFORMOTEROL TARTRATE 15 MCG: 15 SOLUTION RESPIRATORY (INHALATION) at 20:47

## 2021-05-28 RX ADMIN — TORSEMIDE 20 MG: 20 TABLET ORAL at 10:48

## 2021-05-28 RX ADMIN — AMLODIPINE BESYLATE 5 MG: 5 TABLET ORAL at 09:14

## 2021-05-28 RX ADMIN — MEROPENEM 500 MG: 500 INJECTION, POWDER, FOR SOLUTION INTRAVENOUS at 23:24

## 2021-05-28 RX ADMIN — ATORVASTATIN CALCIUM 40 MG: 20 TABLET, FILM COATED ORAL at 21:35

## 2021-05-28 RX ADMIN — APIXABAN 2.5 MG: 2.5 TABLET, FILM COATED ORAL at 09:14

## 2021-05-28 RX ADMIN — SERTRALINE 25 MG: 25 TABLET, FILM COATED ORAL at 09:15

## 2021-05-28 RX ADMIN — BUDESONIDE 500 MCG: 0.5 INHALANT RESPIRATORY (INHALATION) at 20:47

## 2021-05-28 RX ADMIN — PANTOPRAZOLE SODIUM 40 MG: 40 TABLET, DELAYED RELEASE ORAL at 21:35

## 2021-05-28 RX ADMIN — Medication 10 ML: at 23:24

## 2021-05-28 RX ADMIN — BUDESONIDE 500 MCG: 0.5 INHALANT RESPIRATORY (INHALATION) at 07:32

## 2021-05-28 RX ADMIN — Medication 10 ML: at 18:28

## 2021-05-28 RX ADMIN — MEROPENEM 500 MG: 500 INJECTION, POWDER, FOR SOLUTION INTRAVENOUS at 10:47

## 2021-05-28 RX ADMIN — Medication 10 ML: at 06:16

## 2021-05-28 RX ADMIN — APIXABAN 2.5 MG: 2.5 TABLET, FILM COATED ORAL at 21:35

## 2021-05-28 RX ADMIN — ARFORMOTEROL TARTRATE 15 MCG: 15 SOLUTION RESPIRATORY (INHALATION) at 07:32

## 2021-05-28 NOTE — PROGRESS NOTES
NEPHROLOGY PROGRESS NOTE         NAME:Anisha Quesada                   RKL:[id number]   :1932    Chief complaints: F/U PIERRE    Subjective: SOB is much better    24 hr interval history: scr 2.7 from 2.8 and K is better 4.2        Objective:  Vitals:    21 1929 21 2318 21 0312 21 0804   BP: (!) 141/81 136/83 (!) 144/89 (!) 144/85   Pulse: 85 89 82 89   Resp: 16 16 16 16   Temp: 98.3 °F (36.8 °C) 97.8 °F (36.6 °C) 97.8 °F (36.6 °C) 98.4 °F (36.9 °C)   SpO2: 94% 96% 98% 96%   Weight:    90.9 kg (200 lb 4.8 oz)   Height:           Intake/Output Summary (Last 24 hours) at 2021 1003  Last data filed at 2021 0945  Gross per 24 hour   Intake 570 ml   Output 950 ml   Net -380 ml       PHYSICAL EXAM:  GENERAL : Lying down in bed with no acute distress  HEENT: AT NC PEERLA   NECK: Supple no JVP  CVS: S1 S2 RRR, no murmur or gallops heard  RS: CTABL  ABDOMEN: soft NT ND positive BS  EXTREMITY: trace edema clubbing or cyanosis, pedal pulse +  NEUROLOGY: AAA X3, no focal deficit or asterixis      Labs:  CBC:    No results found for: WBC, HGB, HCT, PLT, HGBEXT, HCTEXT, PLTEXT, HGBEXT, HCTEXT, PLTEXT  BMP:   Lab Results   Component Value Date/Time     2021 03:35 AM    K 4.2 2021 03:35 AM     2021 03:35 AM    CO2 34 (H) 2021 03:35 AM    AGAP 2 (L) 2021 03:35 AM    GLU 87 2021 03:35 AM    BUN 56 (H) 2021 03:35 AM    CREA 2.73 (H) 2021 03:35 AM    GFRAA 20 (L) 2021 03:35 AM    GFRNA 16 (L) 2021 03:35 AM        Lab Results   Component Value Date/Time    Color YELLOW/STRAW 2021 04:40 AM    Appearance CLEAR 2021 04:40 AM    Specific gravity 1.010 2021 04:40 AM    Specific gravity 1.020 2013 12:09 PM    pH (UA) 6.0 2021 04:40 AM    Protein Negative 2021 04:40 AM    Glucose Negative 2021 04:40 AM    Ketone Negative 2021 04:40 AM    Bilirubin Negative 2021 04:40 AM Urobilinogen 0.2 05/26/2021 04:40 AM    Nitrites Negative 05/26/2021 04:40 AM    Leukocyte Esterase MODERATE (A) 05/26/2021 04:40 AM    Epithelial cells FEW 05/26/2021 04:40 AM    Bacteria 2+ (A) 05/26/2021 04:40 AM    WBC 10-20 05/26/2021 04:40 AM    RBC 0-5 05/26/2021 04:40 AM       Imaging study:    Assessment &Plan    PIERRE likely d/t prerenal from med's induced ( ARB and loop diuretics)  Hyperkalemia d/t PIERRE and ARB  Hypotension    Plan:  Scr down 2.7 likely new baseline  She has good UOP with Lasix 40 mg IX x1  Resume torsemide 20 mg daily  Renally dose all med's  Hold ARB       Care Plan discussed with:   Medical Team    Ashif Myrlene Runner, MD  5/28/2021    [facility]:  [email or web address]  Joy Escalante office:  [address] [address],[address]  [place], [address] [place] [address]  Phone: [phone]  Fax :     [phone]     [place] office:  [address] [facility]  Scott Llamas  Phone - [phone]  Fax - [phone]

## 2021-05-28 NOTE — PROGRESS NOTES
1626:  Pt likely to d/c Sat to [facility]. She will go to room 426. Nursing to call report to [facility] at [phone]. She is on a will call for [facility]. Packet on chart. Transition of Care Plan:  RUR-20%  1. Continue medical management/treatment  2. Plan is to go back to [facility]  3. [facility] for transportation, will place on will call  4. 2 negative covid results sent to [facility]  5. CM following for any needs  MIKAYLA Ashraf

## 2021-05-28 NOTE — PALLIATIVE CARE DISCHARGE
The Palliative Medicine team was consulted as part of your / your loved one's care in the hospital. Our team is a supportive service; we strive to relieve suffering and improve quality of life. You identified the following goal(s) as your main focus for healthcare: Patient/Health Care Proxy Stated Goals: To live well We reviewed advance care planning information, which includes the following: 
Advance Care Planning 5/28/2021 Patient's Healthcare Decision Maker is: Verbal statement (Legal Next of Kin remains as decision maker) Confirm Advance Directive Yes, not on file Does the patient have other document types Do Not Resuscitate We reviewed / discussed your code status as: DNR 
   Full Code means perform CPR in the event of cardiac arrest 
   [facility] means do NOT perform CPR in the event of cardiac arrest 
   Partial Code means you have specific preferences, please discuss with your health care team 
   No Order means this issue was not addressed / resolved during your stay You have a Durable Do Not Resuscitate Order in place, which should travel with you. When you are in a facility, this form should be placed on your chart. Once you are home, it is recommended that the [facility] form be placed in a visible location such as on the refrigerator or bedroom door. Because of the importance of this information, we are providing you with a printed copy to share with other healthcare providers after this hospitalization is complete. If any of the above information is incomplete or incorrect, please contact the Palliative Medicine team at [phone].

## 2021-05-28 NOTE — PROGRESS NOTES
Bedside and Verbal shift change report given to HOSSEIN Malik (oncoming nurse) by Zaid Sanchez (offgoing nurse). Report included the following information SBAR, Intake/Output and Recent Results.

## 2021-05-28 NOTE — PROGRESS NOTES
[facility] Dosing Services: Antimicrobial Stewardship Progress Note    Consult for antibiotic dosing of meropenem by Dr. Fenton  Pharmacist reviewed antibiotic appropriateness for 80year old , female  for indication of ESBL E. Coli UTI  Day of Therapy 1    Plan:    Recommendation: start meropenem 500mg IV every 12 hours for CrCl 10-24mL/min  Other Antimicrobial  (not dosed by pharmacist)   none   Cultures     5/26 Urine: ESBL E. Coli, probable proteus species (prelim)  5/25 MRSA: negative (final)    Serum Creatinine     Lab Results   Component Value Date/Time    Creatinine 2.73 (H) 05/28/2021 03:35 AM       Creatinine Clearance Estimated Creatinine Clearance: 15.6 mL/min (A) (based on SCr of 2.73 mg/dL (H)).      Procalcitonin    Lab Results   Component Value Date/Time    Procalcitonin <0.05 05/25/2021 11:31 AM        Temp   98.4 °F (36.9 °C)    WBC   Lab Results   Component Value Date/Time    WBC 7.0 05/27/2021 03:46 AM       H/H   Lab Results   Component Value Date/Time    HGB 8.0 (L) 05/27/2021 03:46 AM      Platelets Lab Results   Component Value Date/Time    PLATELET 954 40/04/8533 03:46 AM            Pharmacist: Signed Fred Ramirez Contact information: [id number]

## 2021-05-28 NOTE — PROGRESS NOTES
Arash Dyerelsen [facility] 79  380 [facility], [address] [place] [address]  [phone]      Medical Progress Note      NAME: Rachel Neville   :  1932  MRM:  [id number]    Date of service: 2021  10:21 AM       Assessment and Plan:   1. Renal failure (ARF), acute on chronic / Dehydration - likely due to a combination of her meds (torsemide, lisinopril) and poor PO intake. Her Cr from January of this year was 1.98, 21 was 2.38. Creatinine continue to improve slowly. Follow labs. US of the kidneys: suggesting medical renal disease. Multiple cysts in both kidneys. Evaluated by renal.      2. Acute metabolic encephalopathy / Dementia / Anxiety and depression / Insomnia / Polypharmacy - Continue her typical sertraline, but hold trazodone. Monitor.     3. Atrial fibrillation / Chronic anticoagulation / HTN (hypertension) / Presence of permanent cardiac pacemaker - rate stable, continue norvasc and eliquis. Hold amiodarone due to ARF. No known hx of CHF, so hold ACE and torsemide. evaluated by cardiology.      4. Hx of completed stroke / Hyperlipemia - continue plavix, eliquis and atorvastatin.       5. GERD (gastroesophageal reflux disease) - PPI     6. COPD - Continue brovana and pulmicort in place of symbicort     7. Chronic pain / Arthritis - Tylenol prn. Hold percocet due to confusion. 8.  UTI(POA). UC: GNR. On ceftriaxone      Tried to contact her daughter multiple time by phone, but unable. Subjective:     Chief Complaint[de-identified] Patient was seen and examined as a follow up for ARF. Chart was reviewed. looks better. Fells better     ROS:  (bold if positive, if negative)    Tolerating PT  Tolerating Diet        Objective:     Last 24hrs VS reviewed since prior progress note.  Most recent are:    Visit Vitals  BP (!) 144/85 (BP 1 Location: Right upper arm, BP Patient Position: At rest)   Pulse 89   Temp 98.4 °F (36.9 °C)   Resp 16   Ht 5' 4\" (1.626 m)   Wt 90.9 kg (200 lb 4.8 oz)   SpO2 96%   BMI 34.38 kg/m²     SpO2 Readings from Last 6 Encounters:   05/28/21 96%   10/10/16 96%   08/24/16 98%   08/25/13 99%   06/20/13 99%    O2 Flow Rate (L/min): 2 l/min       Intake/Output Summary (Last 24 hours) at 5/28/2021 0942  Last data filed at 5/28/2021 4448  Gross per 24 hour   Intake 330 ml   Output 950 ml   Net -620 ml        Physical Exam:    Gen:  Well-developed, well-nourished, in no acute distress  HEENT:  Pink conjunctivae, PERRL, hearing intact to voice, moist mucous membranes  Neck:  Supple, without masses, thyroid non-tender  Resp:  No accessory muscle use, clear breath sounds without wheezes rales or rhonchi  Card:  No murmurs, normal S1, S2 without thrills, bruits. ++ peripheral edema  Abd:  Soft, non-tender, non-distended, normoactive bowel sounds are present, no palpable organomegaly and no detectable hernias  Lymph:  No cervical or inguinal adenopathy  Musc:  No cyanosis or clubbing  Skin:  No rashes or ulcers, skin turgor is good  Neuro:  Cranial nerves are grossly intact, no focal motor weakness, follows commands appropriately  Psych:  poor insight,   __________________________________________________________________  Medications Reviewed: (see below)  Medications:     Current Facility-Administered Medications   Medication Dose Route Frequency    cefTRIAXone (ROCEPHIN) 1 g in sterile water (preservative free) 10 mL IV syringe  1 g IntraVENous Q24H    amLODIPine (NORVASC) tablet 5 mg  5 mg Oral DAILY    pantoprazole (PROTONIX) tablet 40 mg  40 mg Oral QHS    sertraline (ZOLOFT) tablet 25 mg  25 mg Oral DAILY    sodium chloride (NS) flush 5-40 mL  5-40 mL IntraVENous Q8H    sodium chloride (NS) flush 5-40 mL  5-40 mL IntraVENous PRN    acetaminophen (TYLENOL) tablet 650 mg  650 mg Oral Q6H PRN    Or    acetaminophen (TYLENOL) suppository 650 mg  650 mg Rectal Q6H PRN    polyethylene glycol (MIRALAX) packet 17 g  17 g Oral DAILY PRN    ondansetron (ZOFRAN ODT) tablet 4 mg  4 mg Oral Q8H PRN    Or    ondansetron (ZOFRAN) injection 4 mg  4 mg IntraVENous Q6H PRN    apixaban (ELIQUIS) tablet 2.5 mg  2.5 mg Oral BID    arformoteroL (BROVANA) neb solution 15 mcg  15 mcg Nebulization BID RT    budesonide (PULMICORT) 500 mcg/2 ml nebulizer suspension  500 mcg Nebulization BID RT    albuterol-ipratropium (DUO-NEB) 2.5 MG-0.5 MG/3 ML  3 mL Nebulization Q6H PRN    atorvastatin (LIPITOR) tablet 40 mg  40 mg Oral QHS        Lab Data Reviewed: (see below)  Lab Review:     Recent Labs     05/27/21  0346 05/26/21  0607 05/25/21  1131   WBC 7.0 6.1 6.4   HGB 8.0* 8.4* 9.1*   HCT 26.1* 27.7* 29.7*    152 168     Recent Labs     05/28/21  0335 05/27/21  0346 05/26/21  0607 05/25/21  1131    138 139 136   K 4.2 4.4 4.5 5.4*    101 100 97   CO2 34* 34* 38* 37*   GLU 87 78 89 122*   BUN 56* 60* 68* 77*   CREA 2.73* 2.79* 3.02* 3.50*   CA 8.8 8.7 8.9 9.2   MG  --   --  2.3  --    PHOS 2.9 2.9 3.1  --    ALB 2.6* 2.6* 2.7* 2.9*   TBILI  --   --  0.4 0.4   ALT  --   --  51 50     No results found for: GLUCPOC  No results for input(s): PH, PCO2, PO2, HCO3, FIO2 in the last 72 hours. No results for input(s): INR, INREXT, INREXT in the last 72 hours. All Micro Results     Procedure Component Value Units Date/Time    COVID-19 RAPID TEST [[id number]] Collected: 05/27/21 1870    Order Status: Completed Specimen: Nasopharyngeal Updated: 05/27/21 1642     Specimen source Nasopharyngeal        COVID-19 rapid test Not detected        Comment: Rapid Abbott ID Now       Rapid NAAT:  The specimen is NEGATIVE for SARS-CoV-2, the novel coronavirus associated with COVID-19. Negative results should be treated as presumptive and, if inconsistent with clinical signs and symptoms or necessary for patient management, should be tested with an alternative molecular assay.   Negative results do not preclude SARS-CoV-2 infection and should not be used as the sole basis for patient management decisions. This test has been authorized by the FDA under an Emergency Use Authorization (EUA) for use by authorized laboratories. Fact sheet for Healthcare Providers: ConventionUpdate.co.nz  Fact sheet for Patients: ConventionUpdate.co.nz       Methodology: Isothermal Nucleic Acid Amplification         MRSA CULTURE [[id number]] Collected: 05/26/21 0600    Order Status: Completed Specimen: Nasal from Nares Updated: 05/27/21 1425     Special Requests: NO SPECIAL REQUESTS        Culture result: MRSA NOT PRESENT               Screening of patient nares for MRSA is for surveillance purposes and, if positive, to facilitate isolation considerations in high risk settings. It is not intended for automatic decolonization interventions per se as regimens are not sufficiently effective to warrant routine use. CULTURE, URINE [[id number]]  (Abnormal) Collected: 05/26/21 0440    Order Status: Completed Specimen: Urine from Clean catch Updated: 05/27/21 1122     Special Requests: NO SPECIAL REQUESTS        [place] Count --        >100,000  COLONIES/mL       Culture result: GRAM NEGATIVE RODS               CHECKING FOR POSSIBLE 2ND GRAM NEGATIVE TIFFANIE          COVID-19 RAPID TEST [[id number]] Collected: 05/26/21 1211    Order Status: Completed Specimen: Nasopharyngeal Updated: 05/26/21 1252     Specimen source Nasopharyngeal        COVID-19 rapid test Not detected        Comment: Rapid Abbott ID Now       Rapid NAAT:  The specimen is NEGATIVE for SARS-CoV-2, the novel coronavirus associated with COVID-19. Negative results should be treated as presumptive and, if inconsistent with clinical signs and symptoms or necessary for patient management, should be tested with an alternative molecular assay. Negative results do not preclude SARS-CoV-2 infection and should not be used as the sole basis for patient management decisions.        This test has been authorized by the FDA under an Emergency Use Authorization (EUA) for use by authorized laboratories. Fact sheet for Healthcare Providers: ConventionUpdate.co.nz  Fact sheet for Patients: ConventionUpdate.co.nz       Methodology: Isothermal Nucleic Acid Amplification               I have reviewed notes of prior 24hr. Other pertinent lab: Total time spent with patient: 28 I personally reviewed chart, notes, data and current medications in the medical record. I have personally examined and treated the patient at bedside during this period.                  Care Plan discussed with: Patient, Nursing Staff and >50% of time spent in counseling and coordination of care    Discussed:  Care Plan    Prophylaxis:  eliquis    Disposition:  SNF/LTC           ___________________________________________________    Attending Physician: Linda Xiao MD

## 2021-05-29 VITALS
HEART RATE: 94 BPM | WEIGHT: 200.3 LBS | RESPIRATION RATE: 22 BRPM | OXYGEN SATURATION: 95 % | SYSTOLIC BLOOD PRESSURE: 146 MMHG | TEMPERATURE: 98.1 F | BODY MASS INDEX: 34.19 KG/M2 | DIASTOLIC BLOOD PRESSURE: 84 MMHG | HEIGHT: 64 IN

## 2021-05-29 PROBLEM — E86.0 DEHYDRATION: Status: RESOLVED | Noted: 2021-05-25 | Resolved: 2021-05-29

## 2021-05-29 PROBLEM — E87.5 HYPERKALEMIA: Status: RESOLVED | Noted: 2021-05-25 | Resolved: 2021-05-29

## 2021-05-29 LAB
ALBUMIN SERPL-MCNC: 2.6 G/DL (ref 3.5–5)
ANION GAP SERPL CALC-SCNC: 3 MMOL/L (ref 5–15)
BACTERIA SPEC CULT: ABNORMAL
BACTERIA SPEC CULT: ABNORMAL
BASOPHILS # BLD: 0 K/UL (ref 0–0.1)
BASOPHILS NFR BLD: 0 % (ref 0–1)
BUN SERPL-MCNC: 49 MG/DL (ref 6–20)
BUN/CREAT SERPL: 19 (ref 12–20)
CALCIUM SERPL-MCNC: 9.3 MG/DL (ref 8.5–10.1)
CC UR VC: ABNORMAL
CHLORIDE SERPL-SCNC: 103 MMOL/L (ref 97–108)
CO2 SERPL-SCNC: 34 MMOL/L (ref 21–32)
COMMENT, HOLDF: NORMAL
CREAT SERPL-MCNC: 2.56 MG/DL (ref 0.55–1.02)
DIFFERENTIAL METHOD BLD: ABNORMAL
EOSINOPHIL # BLD: 0.1 K/UL (ref 0–0.4)
EOSINOPHIL NFR BLD: 1 % (ref 0–7)
ERYTHROCYTE [DISTWIDTH] IN BLOOD BY AUTOMATED COUNT: 18.1 % (ref 11.5–14.5)
GLUCOSE SERPL-MCNC: 93 MG/DL (ref 65–100)
HCT VFR BLD AUTO: 27.3 % (ref 35–47)
HGB BLD-MCNC: 8.4 G/DL (ref 11.5–16)
IMM GRANULOCYTES # BLD AUTO: 0.1 K/UL (ref 0–0.04)
IMM GRANULOCYTES NFR BLD AUTO: 1 % (ref 0–0.5)
LYMPHOCYTES # BLD: 0.5 K/UL (ref 0.8–3.5)
LYMPHOCYTES NFR BLD: 5 % (ref 12–49)
MCH RBC QN AUTO: 30.5 PG (ref 26–34)
MCHC RBC AUTO-ENTMCNC: 30.8 G/DL (ref 30–36.5)
MCV RBC AUTO: 99.3 FL (ref 80–99)
MONOCYTES # BLD: 0.8 K/UL (ref 0–1)
MONOCYTES NFR BLD: 8 % (ref 5–13)
NEUTS SEG # BLD: 8.3 K/UL (ref 1.8–8)
NEUTS SEG NFR BLD: 85 % (ref 32–75)
NRBC # BLD: 0 K/UL (ref 0–0.01)
NRBC BLD-RTO: 0 PER 100 WBC
PHOSPHATE SERPL-MCNC: 3.2 MG/DL (ref 2.6–4.7)
PLATELET # BLD AUTO: 150 K/UL (ref 150–400)
PMV BLD AUTO: 9.8 FL (ref 8.9–12.9)
POTASSIUM SERPL-SCNC: 4.4 MMOL/L (ref 3.5–5.1)
RBC # BLD AUTO: 2.75 M/UL (ref 3.8–5.2)
SAMPLES BEING HELD,HOLD: NORMAL
SERVICE CMNT-IMP: ABNORMAL
SODIUM SERPL-SCNC: 140 MMOL/L (ref 136–145)
WBC # BLD AUTO: 9.8 K/UL (ref 3.6–11)

## 2021-05-29 PROCEDURE — 94640 AIRWAY INHALATION TREATMENT: CPT

## 2021-05-29 PROCEDURE — 85025 COMPLETE CBC W/AUTO DIFF WBC: CPT

## 2021-05-29 PROCEDURE — 74011250637 HC RX REV CODE- 250/637: Performed by: INTERNAL MEDICINE

## 2021-05-29 PROCEDURE — 74011000250 HC RX REV CODE- 250: Performed by: INTERNAL MEDICINE

## 2021-05-29 PROCEDURE — 80069 RENAL FUNCTION PANEL: CPT

## 2021-05-29 PROCEDURE — 74011250636 HC RX REV CODE- 250/636: Performed by: INTERNAL MEDICINE

## 2021-05-29 RX ORDER — GRANULES FOR ORAL 3 G/1
3 POWDER ORAL ONCE
Status: COMPLETED | OUTPATIENT
Start: 2021-05-29 | End: 2021-05-29

## 2021-05-29 RX ORDER — BUMETANIDE 0.25 MG/ML
1 INJECTION INTRAMUSCULAR; INTRAVENOUS ONCE
Status: COMPLETED | OUTPATIENT
Start: 2021-05-29 | End: 2021-05-29

## 2021-05-29 RX ADMIN — BUMETANIDE 1 MG: 0.25 INJECTION INTRAMUSCULAR; INTRAVENOUS at 11:45

## 2021-05-29 RX ADMIN — Medication 10 ML: at 06:10

## 2021-05-29 RX ADMIN — Medication 10 ML: at 13:38

## 2021-05-29 RX ADMIN — ARFORMOTEROL TARTRATE 15 MCG: 15 SOLUTION RESPIRATORY (INHALATION) at 07:31

## 2021-05-29 RX ADMIN — TORSEMIDE 20 MG: 20 TABLET ORAL at 10:16

## 2021-05-29 RX ADMIN — SERTRALINE 25 MG: 25 TABLET, FILM COATED ORAL at 10:17

## 2021-05-29 RX ADMIN — AMLODIPINE BESYLATE 5 MG: 5 TABLET ORAL at 10:17

## 2021-05-29 RX ADMIN — ONDANSETRON 4 MG: 2 INJECTION INTRAMUSCULAR; INTRAVENOUS at 13:37

## 2021-05-29 RX ADMIN — BUDESONIDE 500 MCG: 0.5 INHALANT RESPIRATORY (INHALATION) at 07:31

## 2021-05-29 RX ADMIN — GRANULES FOR ORAL SOLUTION 3 G: 3 POWDER ORAL at 11:45

## 2021-05-29 RX ADMIN — MEROPENEM 500 MG: 500 INJECTION, POWDER, FOR SOLUTION INTRAVENOUS at 10:16

## 2021-05-29 RX ADMIN — APIXABAN 2.5 MG: 2.5 TABLET, FILM COATED ORAL at 10:16

## 2021-05-29 NOTE — DISCHARGE SUMMARY
Hospitalist Discharge Summary     Patient ID:    Betsey Foreman  [id number]  80 y.o.  8/28/1932    Admit date of service: 5/25/2021    Discharge date of service: 5/29/2021    Admission Diagnoses: Renal failure (ARF), acute on chronic ([facility] [N17.9, N18.9]    Chronic Diagnoses:    Problem List as of 5/29/2021 Date Reviewed: 8/24/2016        Codes Class Noted - Resolved    Renal failure (ARF), acute on chronic [facility]) ICD-10-CM: N17.9, N18.9  ICD-9-CM: 584.9, 585.9  5/25/2021 - Present        Hypertension ICD-10-CM: I10  ICD-9-CM: 401.9  Unknown - Present        Hyperlipemia ICD-10-CM: E78.5  ICD-9-CM: 272.4  Unknown - Present        Hx of completed stroke ICD-10-CM: Z86.73  ICD-9-CM: V12.54  Unknown - Present        GERD (gastroesophageal reflux disease) ICD-10-CM: K21.9  ICD-9-CM: 530.81  Unknown - Present        Dementia ([facility] ICD-10-CM: F03.90  ICD-9-CM: 294.20  Unknown - Present        Obese ICD-10-CM: E66.9  ICD-9-CM: 278.00  Unknown - Present        Anxiety and depression ICD-10-CM: F41.9, F32.9  ICD-9-CM: 300.00, 311  Unknown - Present        Polypharmacy ICD-10-CM: Z79.899  ICD-9-CM: V58.69  Unknown - Present        Paroxysmal A-fib ([facility] ICD-10-CM: I48.0  ICD-9-CM: 427.31  Unknown - Present        Presence of permanent cardiac pacemaker ICD-10-CM: Z95.0  ICD-9-CM: V45.01  6/26/2013 - Present        HTN (hypertension) ICD-10-CM: I10  ICD-9-CM: 401.9  6/25/2013 - Present        RESOLVED: Morbid obesity ([facility] ICD-10-CM: E66.01  ICD-9-CM: 278.01  Unknown - 5/25/2021        RESOLVED: Hyperkalemia ICD-10-CM: E87.5  ICD-9-CM: 276.7  5/25/2021 - 5/29/2021        RESOLVED: Dehydration ICD-10-CM: E86.0  ICD-9-CM: 276.51  5/25/2021 - 5/29/2021        RESOLVED: Chronic left shoulder pain ICD-10-CM: M25.512, G89.29  ICD-9-CM: 719.41, 338.29  8/22/2013 - 5/25/2021        RESOLVED: CVA (cerebral vascular accident) [facility]) ICD-10-CM: I63.9  ICD-9-CM: 434.91  6/25/2013 - 5/25/2021              Discharge Medications:   Current Discharge Medication List      CONTINUE these medications which have NOT CHANGED    Details   amLODIPine (NORVASC) 10 mg tablet Take 10 mg by mouth daily. atorvastatin (LIPITOR) 40 mg tablet Take 40 mg by mouth nightly. omeprazole (PRILOSEC) 10 mg capsule Take 30 mg by mouth nightly. torsemide (DEMADEX) 20 mg tablet Take 20 mg by mouth two (2) times a day. cholecalciferol (VITAMIN D3) (2,000 UNITS /50 MCG) cap capsule Take 2,000 Units by mouth daily. ferrous sulfate 325 mg (65 mg iron) tablet Take 325 mg by mouth Daily (before breakfast). albuterol-ipratropium (DUO-NEB) 2.5 mg-0.5 mg/3 ml nebu 3 mL by Nebulization route every six (6) hours as needed for Wheezing. diphenoxylate-atropine (LomotiL) 2.5-0.025 mg per tablet Take 1 Tablet by mouth every six (6) hours as needed for Diarrhea.      melatonin 10 mg tab Take 10 mg by mouth nightly. polyethylene glycol (Miralax) 17 gram packet Take 17 g by mouth daily as needed for Constipation. multivitamin (ONE A DAY) tablet Take 1 Tablet by mouth daily. senna (Senokot) 8.6 mg tablet Take 1 Tablet by mouth daily. budesonide-formoteroL (Symbicort) 80-4.5 mcg/actuation HFAA Take 2 Puffs by inhalation two (2) times a day. acetaminophen (TYLENOL) 650 mg TbER Take 650 mg by mouth every eight (8) hours as needed for Pain. amiodarone (CORDARONE) 200 mg tablet Take 200 mg by mouth daily. sertraline (ZOLOFT) 25 mg tablet Take 25 mg by mouth daily. apixaban (ELIQUIS) 2.5 mg tablet Take 2.5 mg by mouth two (2) times a day. STOP taking these medications       traZODone (DESYREL) 50 mg tablet Comments:   Reason for Stopping:         lisinopriL (PRINIVIL, ZESTRIL) 20 mg tablet Comments:   Reason for Stopping: Follow up Care:    1. Other, MD Salas in 1-2 weeks  2. Diet:  Cardiac Diet    Disposition:  SNF. Advanced Directive:    Discharge Exam:  See today's note.     CONSULTATIONS: Cardiology and Nephrology    Significant Diagnostic Studies:   Recent Labs     05/29/21 0533 05/27/21  0346   WBC 9.8 7.0   HGB 8.4* 8.0*   HCT 27.3* 26.1*    151     Recent Labs     05/29/21 0533 05/28/21  0335 05/27/21  0346    139 138   K 4.4 4.2 4.4    103 101   CO2 34* 34* 34*   BUN 49* 56* 60*   CREA 2.56* 2.73* 2.79*   GLU 93 87 78   CA 9.3 8.8 8.7   PHOS 3.2 2.9 2.9     Recent Labs     05/29/21 0533 05/28/21 0335 05/27/21  0346   ALB 2.6* 2.6* 2.6*     No results for input(s): INR, PTP, APTT, INREXT in the last 72 hours. No results for input(s): FE, TIBC, PSAT, FERR in the last 72 hours. No results for input(s): PH, PCO2, PO2 in the last 72 hours. No results for input(s): CPK, CKMB in the last 72 hours. No lab exists for component: TROPONINI  No results found for: Ele[place] 57:   1. Renal failure (ARF), acute on chronic / Dehydration - likely due to a combination of her meds (torsemide, lisinopril) and poor PO intake.  Her Cr from January of this year was 1.98, 4/30/21 was 2.38. Creatinine continue to improve slowly. US of the kidneys: suggesting medical renal disease. Multiple cysts in both kidneys. Most likely she is at her baseline. Evaluated by renal.      2. Acute metabolic encephalopathy / Dementia / Anxiety and depression / Insomnia / Polypharmacy - Continue her typical sertraline, but hold trazodone.  Monitor.     3. Atrial fibrillation / Chronic anticoagulation / HTN (hypertension) / Presence of permanent cardiac pacemaker - rate stable, continue norvasc and eliquis. Hold amiodarone due to ARF.  No known hx of CHF, so hold ACE, but cont torsemide.  evaluated by cardiology.      4. Hx of completed stroke / Hyperlipemia - continue plavix, eliquis and atorvastatin.       5. GERD (gastroesophageal reflux disease) - PPI     6. COPD - Continue brovana and pulmicort in place of symbicort     7.   Chronic pain / Arthritis - Tylenol prn.  Hold percocet due to confusion.     8.  UTI(POA). UC: ESBL PROTEUS MIRABILIS. Treated with meropenem and will give a dose of fosfamycin      Code status: DNR       Discharged in stable condition.     Spent 35 minutes    Signed:  Analisa Kuo MD  5/29/2021  10:59 AM

## 2021-05-29 NOTE — PROGRESS NOTES
5/29/2021  Case Management Note    2:09 PM  [facility] now set to arrive around 4:15pm. RN aware. [facility] aware. ANGELA Kaiser    10:53 AM  Per MD patient will discharge today back to [facility]. I have arranged transportation for 2:15pm this afternoon. Patient will be going to room 426 at the facility. RN please call report to [phone].      ANGELA Kaiser

## 2021-05-29 NOTE — PROGRESS NOTES
CMS Note  5/29/2021    Patient received their 2nd IMM letter, patient was provided with a copy for their record.   Arun Saenz CMS

## 2021-05-29 NOTE — PROGRESS NOTES
Verbal shift change report given to Pramod Vera RN (oncoming nurse) by Maria Doyle (offgoing nurse). Report included the following information SBAR, Kardex, Intake/Output, MAR, Accordion and Recent Results.

## 2021-05-29 NOTE — PROGRESS NOTES
Bedside and Verbal shift change report given to Yann Reddy RN (oncoming nurse) by Aga Darling RN (offgoing nurse). Report included the following information SBAR, Kardex, Procedure Summary, Intake/Output, MAR, Accordion, Recent Results and Med Rec Status.

## 2021-05-29 NOTE — DISCHARGE INSTRUCTIONS
ACUTE DIAGNOSES:  Renal failure (ARF), acute on chronic (HCC) [N17.9, N18.9]    CHRONIC MEDICAL DIAGNOSES:  Problem List as of 5/29/2021 Date Reviewed: 8/24/2016        Codes Class Noted - Resolved    Renal failure (ARF), acute on chronic [facility]) ICD-10-CM: N17.9, N18.9  ICD-9-CM: 584.9, 585.9  5/25/2021 - Present        Hypertension ICD-10-CM: I10  ICD-9-CM: 401.9  Unknown - Present        Hyperlipemia ICD-10-CM: E78.5  ICD-9-CM: 272.4  Unknown - Present        Hx of completed stroke ICD-10-CM: Z86.73  ICD-9-CM: V12.54  Unknown - Present        GERD (gastroesophageal reflux disease) ICD-10-CM: K21.9  ICD-9-CM: 530.81  Unknown - Present        Dementia ([facility] ICD-10-CM: F03.90  ICD-9-CM: 294.20  Unknown - Present        Obese ICD-10-CM: E66.9  ICD-9-CM: 278.00  Unknown - Present        Anxiety and depression ICD-10-CM: F41.9, F32.9  ICD-9-CM: 300.00, 311  Unknown - Present        Polypharmacy ICD-10-CM: Z79.899  ICD-9-CM: V58.69  Unknown - Present        Paroxysmal A-fib ([facility] ICD-10-CM: I48.0  ICD-9-CM: 427.31  Unknown - Present        Presence of permanent cardiac pacemaker ICD-10-CM: Z95.0  ICD-9-CM: V45.01  6/26/2013 - Present        HTN (hypertension) ICD-10-CM: I10  ICD-9-CM: 401.9  6/25/2013 - Present        RESOLVED: Morbid obesity ([facility] ICD-10-CM: E66.01  ICD-9-CM: 278.01  Unknown - 5/25/2021        RESOLVED: Hyperkalemia ICD-10-CM: E87.5  ICD-9-CM: 276.7  5/25/2021 - 5/29/2021        RESOLVED: Dehydration ICD-10-CM: E86.0  ICD-9-CM: 276.51  5/25/2021 - 5/29/2021        RESOLVED: Chronic left shoulder pain ICD-10-CM: M25.512, G89.29  ICD-9-CM: 719.41, 338.29  8/22/2013 - 5/25/2021        RESOLVED: CVA (cerebral vascular accident) [facility]) ICD-10-CM: I63.9  ICD-9-CM: 434.91  6/25/2013 - 5/25/2021              DISCHARGE MEDICATIONS:          · It is important that you take the medication exactly as they are prescribed.    · Keep your medication in the bottles provided by the pharmacist and keep a list of the medication names, dosages, and times to be taken in your wallet. · Do not take other medications without consulting your doctor. DIET:  Cardiac Diet    ACTIVITY: Activity as tolerated    ADDITIONAL INFORMATION: If you experience any of the following symptoms then please call your primary care physician or return to the emergency room if you cannot get hold of your doctor: Fever, chills, nausea, vomiting, diarrhea, change in mentation, falling, bleeding, shortness of breath. FOLLOW UP CARE:              Information obtained by :  I understand that if any problems occur once I am at home I am to contact my physician. I understand and acknowledge receipt of the instructions indicated above.                                                                                                                                            Physician's or R.N.'s Signature                                                                  Date/Time                                                                                                                                              Patient or Representative Signature                                                          Date/Time

## 2021-05-29 NOTE — PROGRESS NOTES
Arash Dyerelsen [facility] 79  [address] [facility], [address] [place] [address]  [phone]      Medical Progress Note      NAME: Rachel Neville   :  1932  MRM:  [id number]    Date of service: 2021  10:21 AM       Assessment and Plan:   1. Renal failure (ARF), acute on chronic / Dehydration - likely due to a combination of her meds (torsemide, lisinopril) and poor PO intake. Her Cr from January of this year was 1.98, 21 was 2.38. Creatinine continue to improve slowly. US of the kidneys: suggesting medical renal disease. Multiple cysts in both kidneys. Most likely she is at her baseline. Evaluated by renal.      2. Acute metabolic encephalopathy / Dementia / Anxiety and depression / Insomnia / Polypharmacy - Continue her typical sertraline, but hold trazodone. Monitor.     3. Atrial fibrillation / Chronic anticoagulation / HTN (hypertension) / Presence of permanent cardiac pacemaker - rate stable, continue norvasc and eliquis. Hold amiodarone due to ARF. No known hx of CHF, so hold ACE, but cont torsemide. evaluated by cardiology.      4. Hx of completed stroke / Hyperlipemia - continue plavix, eliquis and atorvastatin.       5. GERD (gastroesophageal reflux disease) - PPI     6. COPD - Continue brovana and pulmicort in place of symbicort     7. Chronic pain / Arthritis - Tylenol prn. Hold percocet due to confusion. 8.  UTI(POA). UC: GNR. On ceftriaxone      Tried to contact her daughter multiple time by phone, but unable. Subjective:     Chief Complaint[de-identified] Patient was seen and examined as a follow up for ARF. Chart was reviewed. looks better. Fells better     ROS:  (bold if positive, if negative)    Tolerating PT  Tolerating Diet        Objective:     Last 24hrs VS reviewed since prior progress note.  Most recent are:    Visit Vitals  BP (!) 143/83 (BP 1 Location: Right arm, BP Patient Position: At rest)   Pulse 93   Temp 98.1 °F (36.7 °C)   Resp 18   Ht 5' 4\" (1.626 m) Wt 90.9 kg (200 lb 4.8 oz)   SpO2 96%   BMI 34.38 kg/m²     SpO2 Readings from Last 6 Encounters:   05/29/21 96%   10/10/16 96%   08/24/16 98%   08/25/13 99%   06/20/13 99%    O2 Flow Rate (L/min): 2 l/min       Intake/Output Summary (Last 24 hours) at 5/29/2021 1055  Last data filed at 5/29/2021 1071  Gross per 24 hour   Intake 260 ml   Output 850 ml   Net -590 ml        Physical Exam:    Gen:  Well-developed, well-nourished, in no acute distress  HEENT:  Pink conjunctivae, PERRL, hearing intact to voice, moist mucous membranes  Neck:  Supple, without masses, thyroid non-tender  Resp:  No accessory muscle use, clear breath sounds without wheezes rales or rhonchi  Card:  No murmurs, normal S1, S2 without thrills, bruits. ++ peripheral edema  Abd:  Soft, non-tender, non-distended, normoactive bowel sounds are present, no palpable organomegaly and no detectable hernias  Lymph:  No cervical or inguinal adenopathy  Musc:  No cyanosis or clubbing  Skin:  No rashes or ulcers, skin turgor is good  Neuro:  Cranial nerves are grossly intact, no focal motor weakness, follows commands appropriately  Psych:  poor insight,   __________________________________________________________________  Medications Reviewed: (see below)  Medications:     Current Facility-Administered Medications   Medication Dose Route Frequency    bumetanide (BUMEX) injection 1 mg  1 mg IntraVENous ONCE    torsemide (DEMADEX) tablet 20 mg  20 mg Oral DAILY    meropenem (MERREM) 500 mg in sterile water (preservative free) 10 mL IV syringe  0.5 g IntraVENous Q12H    amLODIPine (NORVASC) tablet 5 mg  5 mg Oral DAILY    pantoprazole (PROTONIX) tablet 40 mg  40 mg Oral QHS    sertraline (ZOLOFT) tablet 25 mg  25 mg Oral DAILY    sodium chloride (NS) flush 5-40 mL  5-40 mL IntraVENous Q8H    sodium chloride (NS) flush 5-40 mL  5-40 mL IntraVENous PRN    acetaminophen (TYLENOL) tablet 650 mg  650 mg Oral Q6H PRN    Or    acetaminophen (TYLENOL) suppository 650 mg  650 mg Rectal Q6H PRN    polyethylene glycol (MIRALAX) packet 17 g  17 g Oral DAILY PRN    ondansetron (ZOFRAN ODT) tablet 4 mg  4 mg Oral Q8H PRN    Or    ondansetron (ZOFRAN) injection 4 mg  4 mg IntraVENous Q6H PRN    apixaban (ELIQUIS) tablet 2.5 mg  2.5 mg Oral BID    arformoteroL (BROVANA) neb solution 15 mcg  15 mcg Nebulization BID RT    budesonide (PULMICORT) 500 mcg/2 ml nebulizer suspension  500 mcg Nebulization BID RT    albuterol-ipratropium (DUO-NEB) 2.5 MG-0.5 MG/3 ML  3 mL Nebulization Q6H PRN    atorvastatin (LIPITOR) tablet 40 mg  40 mg Oral QHS        Lab Data Reviewed: (see below)  Lab Review:     Recent Labs     05/29/21 0533 05/27/21  0346   WBC 9.8 7.0   HGB 8.4* 8.0*   HCT 27.3* 26.1*    151     Recent Labs     05/29/21  0533 05/28/21  0335 05/27/21  0346    139 138   K 4.4 4.2 4.4    103 101   CO2 34* 34* 34*   GLU 93 87 78   BUN 49* 56* 60*   CREA 2.56* 2.73* 2.79*   CA 9.3 8.8 8.7   PHOS 3.2 2.9 2.9   ALB 2.6* 2.6* 2.6*     No results found for: GLUCPOC  No results for input(s): PH, PCO2, PO2, HCO3, FIO2 in the last 72 hours. No results for input(s): INR, INREXT, INREXT in the last 72 hours.   All Micro Results     Procedure Component Value Units Date/Time    CULTURE, URINE [[id number]]  (Abnormal)  (Susceptibility) Collected: 05/26/21 0440    Order Status: Completed Specimen: Urine from Clean catch Updated: 05/29/21 0914     Special Requests: NO SPECIAL REQUESTS        [place] Count --        >100,000  COLONIES/mL       Culture result:       ESCHERICHIA COLI ** (EXTENDED SPECTRUM BETA LACTAMASE ) **            PROTEUS MIRABILIS       COVID-19 RAPID TEST [[id number]] Collected: 05/27/21 1550    Order Status: Completed Specimen: Nasopharyngeal Updated: 05/27/21 1642     Specimen source Nasopharyngeal        COVID-19 rapid test Not detected        Comment: Rapid Abbott ID Now       Rapid NAAT:  The specimen is NEGATIVE for SARS-CoV-2, the novel coronavirus associated with COVID-19. Negative results should be treated as presumptive and, if inconsistent with clinical signs and symptoms or necessary for patient management, should be tested with an alternative molecular assay. Negative results do not preclude SARS-CoV-2 infection and should not be used as the sole basis for patient management decisions. This test has been authorized by the FDA under an Emergency Use Authorization (EUA) for use by authorized laboratories. Fact sheet for Healthcare Providers: [employer]date[employer]co[employer]nz  Fact sheet for Patients: [employer]nz       Methodology: Isothermal Nucleic Acid Amplification         MRSA CULTURE [[id number]] Collected: 05/26/21 0600    Order Status: Completed Specimen: Nasal from Nares Updated: 05/27/21 1425     Special Requests: NO SPECIAL REQUESTS        Culture result: MRSA NOT PRESENT               Screening of patient nares for MRSA is for surveillance purposes and, if positive, to facilitate isolation considerations in high risk settings. It is not intended for automatic decolonization interventions per se as regimens are not sufficiently effective to warrant routine use. COVID-19 RAPID TEST [[id number]] Collected: 05/26/21 1211    Order Status: Completed Specimen: Nasopharyngeal Updated: 05/26/21 1252     Specimen source Nasopharyngeal        COVID-19 rapid test Not detected        Comment: Rapid Abbott ID Now       Rapid NAAT:  The specimen is NEGATIVE for SARS-CoV-2, the novel coronavirus associated with COVID-19. Negative results should be treated as presumptive and, if inconsistent with clinical signs and symptoms or necessary for patient management, should be tested with an alternative molecular assay. Negative results do not preclude SARS-CoV-2 infection and should not be used as the sole basis for patient management decisions.        This test has been authorized by the FDA under an Emergency Use Authorization (EUA) for use by authorized laboratories. Fact sheet for Healthcare Providers: [employer]uy  Fact sheet for Patients: [employer]end[employer]uy       Methodology: Isothermal Nucleic Acid Amplification               I have reviewed notes of prior 24hr. Other pertinent lab: Total time spent with patient: 28 I personally reviewed chart, notes, data and current medications in the medical record. I have personally examined and treated the patient at bedside during this period.                  Care Plan discussed with: Patient, Nursing Staff and >50% of time spent in counseling and coordination of care    Discussed:  Care Plan    Prophylaxis:  eliquis    Disposition:  SNF/LTC           ___________________________________________________    Attending Physician: Kenia Justice MD

## 2023-11-02 NOTE — PROGRESS NOTES
5/25/2021  3:13 PM  Case management note    Reason for Admission:   Acute Renal Failure    Patient came to hospital for high K. Patient is altered at baseline. She lives at [facility] and has been there since 2018  Patient is not ambulatory. She needs assistance for most ADL's  Patient has history of af and GERD. Patient is blind. She follows the MD at [facility] Score:     moderate             PCP: First and Last name [facility] MD     Name of Practice:    Are you a current patient: Yes/No:    Approximate date of last visit: monthly   Can you participate in a virtual visit if needed:     Do you (patient/family) have any concerns for transition/discharge? Plan for utilizing home health:       Current Advanced Directive/Advance Care Plan:  DNR      Healthcare Decision Maker:   David Ashley , ,               Transition of Care Plan:          1. Return to SNF  2. PCP follow up  3. CM to follow for discharge needs    Care Management Interventions  PCP Verified by CM:  Yes Jeannine Gómez MD)  Mode of Transport at Discharge: [facility] Follow Up Transport: Other (see comment)  The Plan for Transition of Care is Related to the Following Treatment Goals : Acute Renal Failure  Discharge Location  Discharge Placement: Skilled nursing facility  Liborio Cisneros Last OV: 10/11/2023   02/06/23 HTN   Last RX:    Next scheduled apt: Visit date not found          Surescript requesting a refill